# Patient Record
Sex: MALE | Race: WHITE | NOT HISPANIC OR LATINO | Employment: FULL TIME | URBAN - METROPOLITAN AREA
[De-identification: names, ages, dates, MRNs, and addresses within clinical notes are randomized per-mention and may not be internally consistent; named-entity substitution may affect disease eponyms.]

---

## 2017-10-23 ENCOUNTER — OFFICE VISIT (OUTPATIENT)
Dept: URGENT CARE | Facility: CLINIC | Age: 32
End: 2017-10-23
Payer: COMMERCIAL

## 2017-10-23 VITALS
OXYGEN SATURATION: 100 % | HEART RATE: 84 BPM | WEIGHT: 240 LBS | TEMPERATURE: 98 F | RESPIRATION RATE: 18 BRPM | BODY MASS INDEX: 33.47 KG/M2 | SYSTOLIC BLOOD PRESSURE: 143 MMHG | DIASTOLIC BLOOD PRESSURE: 85 MMHG

## 2017-10-23 DIAGNOSIS — J32.9 SINUSITIS, UNSPECIFIED CHRONICITY, UNSPECIFIED LOCATION: ICD-10-CM

## 2017-10-23 DIAGNOSIS — J02.9 PHARYNGITIS, UNSPECIFIED ETIOLOGY: Primary | ICD-10-CM

## 2017-10-23 LAB
CTP QC/QA: YES
S PYO RRNA THROAT QL PROBE: NEGATIVE

## 2017-10-23 PROCEDURE — 99203 OFFICE O/P NEW LOW 30 MIN: CPT | Mod: S$GLB,,, | Performed by: PHYSICIAN ASSISTANT

## 2017-10-23 PROCEDURE — 87880 STREP A ASSAY W/OPTIC: CPT | Mod: QW,S$GLB,, | Performed by: PHYSICIAN ASSISTANT

## 2017-10-23 RX ORDER — AMOXICILLIN AND CLAVULANATE POTASSIUM 875; 125 MG/1; MG/1
1 TABLET, FILM COATED ORAL 2 TIMES DAILY
Qty: 14 TABLET | Refills: 0 | Status: SHIPPED | OUTPATIENT
Start: 2017-10-23 | End: 2017-10-30

## 2017-10-23 NOTE — PROGRESS NOTES
Subjective:       Patient ID: Brian Gonzalez is a 32 y.o. male.    Vitals:  weight is 108.9 kg (240 lb). His temperature is 98.3 °F (36.8 °C). His blood pressure is 143/85 (abnormal) and his pulse is 84. His respiration is 18 and oxygen saturation is 100%.     Chief Complaint: Sore Throat    Sore Throat    This is a new problem. The current episode started 1 to 4 weeks ago. The problem has been unchanged. The pain is worse on the right side. There has been no fever. The pain is at a severity of 5/10. The pain is mild. Associated symptoms include congestion, a hoarse voice and swollen glands. Pertinent negatives include no abdominal pain, coughing, diarrhea, ear pain, headaches, shortness of breath or vomiting. He has had no exposure to strep or mono. He has tried nothing for the symptoms.     Review of Systems   Constitution: Negative for chills, fever and malaise/fatigue.   HENT: Positive for congestion, hoarse voice and sore throat. Negative for ear pain.    Eyes: Negative for discharge and redness.   Cardiovascular: Negative for chest pain, dyspnea on exertion and leg swelling.   Respiratory: Negative for cough, shortness of breath, sputum production and wheezing.    Musculoskeletal: Negative for myalgias.   Gastrointestinal: Negative for abdominal pain, diarrhea, nausea and vomiting.   Neurological: Negative for headaches.       Objective:      Physical Exam   Constitutional: He is oriented to person, place, and time. He appears well-developed and well-nourished. No distress.   HENT:   Head: Normocephalic and atraumatic.   Right Ear: Hearing, tympanic membrane, external ear and ear canal normal.   Left Ear: Hearing, tympanic membrane, external ear and ear canal normal.   Nose: Mucosal edema present. Right sinus exhibits no maxillary sinus tenderness and no frontal sinus tenderness. Left sinus exhibits no maxillary sinus tenderness and no frontal sinus tenderness.   Mouth/Throat: Uvula is midline and mucous  membranes are normal. Oropharyngeal exudate and posterior oropharyngeal erythema present. No posterior oropharyngeal edema.   Eyes: Conjunctivae, EOM and lids are normal.   Neck: Normal range of motion. Neck supple.   Cardiovascular: Normal rate, regular rhythm and normal heart sounds.  Exam reveals no gallop and no friction rub.    No murmur heard.  Pulmonary/Chest: Effort normal and breath sounds normal. No respiratory distress. He has no wheezes. He has no rales.   Musculoskeletal: Normal range of motion.   Lymphadenopathy:        Head (right side): Submandibular and tonsillar adenopathy present.        Head (left side): No submandibular and no tonsillar adenopathy present.   Neurological: He is alert and oriented to person, place, and time.   Skin: Skin is warm and dry. No rash noted. No erythema.   Psychiatric: He has a normal mood and affect. His behavior is normal.   Nursing note and vitals reviewed.      Assessment:       1. Pharyngitis, unspecified etiology    2. Sinusitis, unspecified chronicity, unspecified location        Office Visit on 10/23/2017   Component Date Value Ref Range Status    Rapid Strep A Screen 10/23/2017 Negative  Negative Final     Acceptable 10/23/2017 Yes   Final   ]  Plan:         Pharyngitis, unspecified etiology  -     POCT rapid strep A  -     amoxicillin-clavulanate 875-125mg (AUGMENTIN) 875-125 mg per tablet; Take 1 tablet by mouth 2 (two) times daily.  Dispense: 14 tablet; Refill: 0    Sinusitis, unspecified chronicity, unspecified location  -     amoxicillin-clavulanate 875-125mg (AUGMENTIN) 875-125 mg per tablet; Take 1 tablet by mouth 2 (two) times daily.  Dispense: 14 tablet; Refill: 0      Patient Instructions     Please follow up with your primary care provider within 2-5 days if your signs and symptoms have not resolved or worsen.     If your condition worsens or fails to improve we recommend that you receive another evaluation at the emergency room  immediately or contact your primary medical clinic to discuss your concerns.   You must understand that you have received an Urgent Care treatment only and that you may be released before all of your medical problems are known or treated. You, the patient, will arrange for follow up care as instructed.       Self-Care for Sinusitis     Drinking plenty of water can help sinuses drain.   Sinusitis can often be managed with self-care. Self-care can keep sinuses moist and make you feel more comfortable. Remember to follow your doctor's instructions closely. This can make a big difference in getting your sinus problem under control.  Drink fluids  Drinking extra fluids helps thin your mucus. This lets it drain from your sinuses more easily. Have a glass of water every hour or two. A humidifier helps in much the same way. Fluids can also offset the drying effects of certain medicines. If you use a humidifier, follow the product maker's instructions on how to use it. Clean it on a regular schedule.  Use saltwater rinses  Rinses help keep your sinuses and nose moist. Mix a teaspoon of salt in 8 ounces of fresh, warm water. Use a bulb syringe to gently squirt the water into your nose a few times a day. You can also buy ready-made saline nasal sprays.  Apply hot or cold packs  Applying heat to the area surrounding your sinuses may make you feel more comfortable. Use a hot water bottle or a hand towel dipped in hot water. Some people also find ice packs effective for relieving pain.  Medicines  Your doctor may prescribe medications to help treat your sinusitis. If you have an infection, antibiotics can help clear it up. If you are prescribed antibiotics, take all pills on schedule until they are gone, even if you feel better. Decongestants help relieve swelling. Use decongestant sprays for short periods only under the direction of your doctor. If you have allergies, your doctor may prescribe medications to help relieve them.    Date Last Reviewed: 10/1/2016  © 6442-2424 [employer]. [address] [place] [address], [place] [address]. All rights reserved. This information is not intended as a substitute for professional medical care. Always follow your healthcare professional's instructions.        Self-Care for Sore Throats    Sore throats happen for many reasons, such as colds, allergies, and infections caused by viruses or bacteria. In any case, your throat becomes red and sore. Your goal for self-care is to reduce your discomfort while giving your throat a chance to heal.  Moisten and soothe your throat  Tips include the following:  · Try a sip of water first thing after waking up.  · Keep your throat moist by drinking 6 or more glasses of clear liquids every day.  · Run a cool-air humidifier in your room overnight.  · Avoid cigarette smoke.   · Suck on throat lozenges, cough drops, hard candy, ice chips, or frozen fruit-juice bars. Use the sugar-free versions if your diet or medical condition requires them.  Gargle to ease irritation  Gargling every hour or 2 can ease irritation. Try gargling with 1 of these solutions:  · 1/4 teaspoon of salt in 1/2 cup of warm water  · An over-the-counter anesthetic gargle  Use medicine for more relief  Over-the-counter medicine can reduce sore throat symptoms. Ask your pharmacist if you have questions about which medicine to use:  · Ease pain with anesthetic sprays. Aspirin or an aspirin substitute also helps. Remember, never give aspirin to anyone 18 or younger, or if you are already taking blood thinners.   · For sore throats caused by allergies, try antihistamines to block the allergic reaction.  · Remember: unless a sore throat is caused by a bacterial infection, antibiotics wont help you.  Prevent future sore throats  Prevention tips include the following:  · Stop smoking or reduce contact with secondhand smoke. Smoke irritates the tender throat lining.  · Limit contact with pets and  with allergy-causing substances, such as pollen and mold.  · When youre around someone with a sore throat or cold, wash your hands often to keep viruses or bacteria from spreading.  · Dont strain your vocal cords.  Call your healthcare provider  Contact your healthcare provider if you have:  · A temperature over 101°F (38.3°C)  · White spots on the throat  · Great difficulty swallowing  · Trouble breathing  · A skin rash  · Recent exposure to someone else with strep bacteria  · Severe hoarseness and swollen glands in the neck or jaw   Date Last Reviewed: 8/1/2016  © 4870-1076 [employer]. [address] [place] [address]. All rights reserved. This information is not intended as a substitute for professional medical care. Always follow your healthcare professional's instructions.

## 2017-10-23 NOTE — PATIENT INSTRUCTIONS
Please follow up with your primary care provider within 2-5 days if your signs and symptoms have not resolved or worsen.     If your condition worsens or fails to improve we recommend that you receive another evaluation at the emergency room immediately or contact your primary medical clinic to discuss your concerns.   You must understand that you have received an Urgent Care treatment only and that you may be released before all of your medical problems are known or treated. You, the patient, will arrange for follow up care as instructed.       Self-Care for Sinusitis     Drinking plenty of water can help sinuses drain.   Sinusitis can often be managed with self-care. Self-care can keep sinuses moist and make you feel more comfortable. Remember to follow your doctor's instructions closely. This can make a big difference in getting your sinus problem under control.  Drink fluids  Drinking extra fluids helps thin your mucus. This lets it drain from your sinuses more easily. Have a glass of water every hour or two. A humidifier helps in much the same way. Fluids can also offset the drying effects of certain medicines. If you use a humidifier, follow the product maker's instructions on how to use it. Clean it on a regular schedule.  Use saltwater rinses  Rinses help keep your sinuses and nose moist. Mix a teaspoon of salt in 8 ounces of fresh, warm water. Use a bulb syringe to gently squirt the water into your nose a few times a day. You can also buy ready-made saline nasal sprays.  Apply hot or cold packs  Applying heat to the area surrounding your sinuses may make you feel more comfortable. Use a hot water bottle or a hand towel dipped in hot water. Some people also find ice packs effective for relieving pain.  Medicines  Your doctor may prescribe medications to help treat your sinusitis. If you have an infection, antibiotics can help clear it up. If you are prescribed antibiotics, take all pills on schedule until  they are gone, even if you feel better. Decongestants help relieve swelling. Use decongestant sprays for short periods only under the direction of your doctor. If you have allergies, your doctor may prescribe medications to help relieve them.   Date Last Reviewed: 10/1/2016  © 4326-5281 [employer]. [address] [place] [address], [place] [address]. All rights reserved. This information is not intended as a substitute for professional medical care. Always follow your healthcare professional's instructions.        Self-Care for Sore Throats    Sore throats happen for many reasons, such as colds, allergies, and infections caused by viruses or bacteria. In any case, your throat becomes red and sore. Your goal for self-care is to reduce your discomfort while giving your throat a chance to heal.  Moisten and soothe your throat  Tips include the following:  · Try a sip of water first thing after waking up.  · Keep your throat moist by drinking 6 or more glasses of clear liquids every day.  · Run a cool-air humidifier in your room overnight.  · Avoid cigarette smoke.   · Suck on throat lozenges, cough drops, hard candy, ice chips, or frozen fruit-juice bars. Use the sugar-free versions if your diet or medical condition requires them.  Gargle to ease irritation  Gargling every hour or 2 can ease irritation. Try gargling with 1 of these solutions:  · 1/4 teaspoon of salt in 1/2 cup of warm water  · An over-the-counter anesthetic gargle  Use medicine for more relief  Over-the-counter medicine can reduce sore throat symptoms. Ask your pharmacist if you have questions about which medicine to use:  · Ease pain with anesthetic sprays. Aspirin or an aspirin substitute also helps. Remember, never give aspirin to anyone 18 or younger, or if you are already taking blood thinners.   · For sore throats caused by allergies, try antihistamines to block the allergic reaction.  · Remember: unless a sore throat is caused by a  bacterial infection, antibiotics wont help you.  Prevent future sore throats  Prevention tips include the following:  · Stop smoking or reduce contact with secondhand smoke. Smoke irritates the tender throat lining.  · Limit contact with pets and with allergy-causing substances, such as pollen and mold.  · When youre around someone with a sore throat or cold, wash your hands often to keep viruses or bacteria from spreading.  · Dont strain your vocal cords.  Call your healthcare provider  Contact your healthcare provider if you have:  · A temperature over 101°F (38.3°C)  · White spots on the throat  · Great difficulty swallowing  · Trouble breathing  · A skin rash  · Recent exposure to someone else with strep bacteria  · Severe hoarseness and swollen glands in the neck or jaw   Date Last Reviewed: 8/1/2016  © 6615-6858 [employer]. [address] [place] [address], [place] [address]. All rights reserved. This information is not intended as a substitute for professional medical care. Always follow your healthcare professional's instructions.

## 2021-04-07 ENCOUNTER — IMMUNIZATION (OUTPATIENT)
Dept: FAMILY MEDICINE | Facility: CLINIC | Age: 36
End: 2021-04-07
Payer: COMMERCIAL

## 2021-04-07 DIAGNOSIS — Z23 NEED FOR VACCINATION: Primary | ICD-10-CM

## 2021-04-07 PROCEDURE — 0001A COVID-19, MRNA, LNP-S, PF, 30 MCG/0.3 ML DOSE VACCINE: ICD-10-PCS | Mod: CV19,S$GLB,, | Performed by: INTERNAL MEDICINE

## 2021-04-07 PROCEDURE — 91300 COVID-19, MRNA, LNP-S, PF, 30 MCG/0.3 ML DOSE VACCINE: ICD-10-PCS | Mod: S$GLB,,, | Performed by: INTERNAL MEDICINE

## 2021-04-07 PROCEDURE — 91300 COVID-19, MRNA, LNP-S, PF, 30 MCG/0.3 ML DOSE VACCINE: CPT | Mod: S$GLB,,, | Performed by: INTERNAL MEDICINE

## 2021-04-07 PROCEDURE — 0001A COVID-19, MRNA, LNP-S, PF, 30 MCG/0.3 ML DOSE VACCINE: CPT | Mod: CV19,S$GLB,, | Performed by: INTERNAL MEDICINE

## 2021-04-28 ENCOUNTER — IMMUNIZATION (OUTPATIENT)
Dept: FAMILY MEDICINE | Facility: CLINIC | Age: 36
End: 2021-04-28

## 2021-04-28 DIAGNOSIS — Z23 NEED FOR VACCINATION: Primary | ICD-10-CM

## 2021-04-28 PROCEDURE — 91300 COVID-19, MRNA, LNP-S, PF, 30 MCG/0.3 ML DOSE VACCINE: CPT | Mod: ,,, | Performed by: FAMILY MEDICINE

## 2021-04-28 PROCEDURE — 0002A COVID-19, MRNA, LNP-S, PF, 30 MCG/0.3 ML DOSE VACCINE: ICD-10-PCS | Mod: CV19,,, | Performed by: FAMILY MEDICINE

## 2021-04-28 PROCEDURE — 91300 COVID-19, MRNA, LNP-S, PF, 30 MCG/0.3 ML DOSE VACCINE: ICD-10-PCS | Mod: ,,, | Performed by: FAMILY MEDICINE

## 2021-04-28 PROCEDURE — 0002A COVID-19, MRNA, LNP-S, PF, 30 MCG/0.3 ML DOSE VACCINE: CPT | Mod: CV19,,, | Performed by: FAMILY MEDICINE

## 2021-11-30 ENCOUNTER — IMMUNIZATION (OUTPATIENT)
Dept: FAMILY MEDICINE | Facility: CLINIC | Age: 36
End: 2021-11-30
Payer: MEDICAID

## 2021-11-30 DIAGNOSIS — Z23 NEED FOR VACCINATION: Primary | ICD-10-CM

## 2021-11-30 PROCEDURE — 0004A COVID-19, MRNA, LNP-S, PF, 30 MCG/0.3 ML DOSE VACCINE: CPT | Mod: PBBFAC,PO

## 2022-11-02 ENCOUNTER — LAB VISIT (OUTPATIENT)
Dept: LAB | Facility: HOSPITAL | Age: 37
End: 2022-11-02
Attending: STUDENT IN AN ORGANIZED HEALTH CARE EDUCATION/TRAINING PROGRAM
Payer: COMMERCIAL

## 2022-11-02 ENCOUNTER — OFFICE VISIT (OUTPATIENT)
Dept: FAMILY MEDICINE | Facility: CLINIC | Age: 37
End: 2022-11-02
Payer: COMMERCIAL

## 2022-11-02 VITALS
HEIGHT: 72 IN | BODY MASS INDEX: 31.08 KG/M2 | OXYGEN SATURATION: 98 % | WEIGHT: 229.5 LBS | SYSTOLIC BLOOD PRESSURE: 136 MMHG | HEART RATE: 61 BPM | RESPIRATION RATE: 18 BRPM | DIASTOLIC BLOOD PRESSURE: 82 MMHG

## 2022-11-02 DIAGNOSIS — Z23 NEED FOR VACCINATION: ICD-10-CM

## 2022-11-02 DIAGNOSIS — F41.9 ANXIETY: ICD-10-CM

## 2022-11-02 DIAGNOSIS — R42 DIZZINESS: ICD-10-CM

## 2022-11-02 DIAGNOSIS — Z00.00 HEALTHCARE MAINTENANCE: ICD-10-CM

## 2022-11-02 DIAGNOSIS — J30.2 SEASONAL ALLERGIES: ICD-10-CM

## 2022-11-02 DIAGNOSIS — Z00.00 HEALTHCARE MAINTENANCE: Primary | ICD-10-CM

## 2022-11-02 LAB
ANION GAP SERPL CALC-SCNC: 7 MMOL/L (ref 8–16)
BUN SERPL-MCNC: 11 MG/DL (ref 6–20)
CALCIUM SERPL-MCNC: 9 MG/DL (ref 8.7–10.5)
CHLORIDE SERPL-SCNC: 103 MMOL/L (ref 95–110)
CHOLEST SERPL-MCNC: 185 MG/DL (ref 120–199)
CHOLEST/HDLC SERPL: 5.1 {RATIO} (ref 2–5)
CO2 SERPL-SCNC: 29 MMOL/L (ref 23–29)
CREAT SERPL-MCNC: 0.9 MG/DL (ref 0.5–1.4)
EST. GFR  (NO RACE VARIABLE): >60 ML/MIN/1.73 M^2
GLUCOSE SERPL-MCNC: 86 MG/DL (ref 70–110)
HCV AB SERPL QL IA: NORMAL
HDLC SERPL-MCNC: 36 MG/DL (ref 40–75)
HDLC SERPL: 19.5 % (ref 20–50)
HIV 1+2 AB+HIV1 P24 AG SERPL QL IA: NORMAL
LDLC SERPL CALC-MCNC: 123.8 MG/DL (ref 63–159)
NONHDLC SERPL-MCNC: 149 MG/DL
POTASSIUM SERPL-SCNC: 3.9 MMOL/L (ref 3.5–5.1)
SODIUM SERPL-SCNC: 139 MMOL/L (ref 136–145)
TRIGL SERPL-MCNC: 126 MG/DL (ref 30–150)

## 2022-11-02 PROCEDURE — 36415 COLL VENOUS BLD VENIPUNCTURE: CPT | Mod: PO | Performed by: STUDENT IN AN ORGANIZED HEALTH CARE EDUCATION/TRAINING PROGRAM

## 2022-11-02 PROCEDURE — 3075F SYST BP GE 130 - 139MM HG: CPT | Mod: CPTII,S$GLB,, | Performed by: STUDENT IN AN ORGANIZED HEALTH CARE EDUCATION/TRAINING PROGRAM

## 2022-11-02 PROCEDURE — 3075F PR MOST RECENT SYSTOLIC BLOOD PRESS GE 130-139MM HG: ICD-10-PCS | Mod: CPTII,S$GLB,, | Performed by: STUDENT IN AN ORGANIZED HEALTH CARE EDUCATION/TRAINING PROGRAM

## 2022-11-02 PROCEDURE — 87389 HIV-1 AG W/HIV-1&-2 AB AG IA: CPT | Performed by: STUDENT IN AN ORGANIZED HEALTH CARE EDUCATION/TRAINING PROGRAM

## 2022-11-02 PROCEDURE — 99204 OFFICE O/P NEW MOD 45 MIN: CPT | Mod: S$GLB,,, | Performed by: STUDENT IN AN ORGANIZED HEALTH CARE EDUCATION/TRAINING PROGRAM

## 2022-11-02 PROCEDURE — 91312 COVID-19, MRNA, LNP-S, BIVALENT BOOSTER, PF, 30 MCG/0.3 ML DOSE: CPT | Mod: S$GLB,,, | Performed by: STUDENT IN AN ORGANIZED HEALTH CARE EDUCATION/TRAINING PROGRAM

## 2022-11-02 PROCEDURE — 99999 PR PBB SHADOW E&M-EST. PATIENT-LVL IV: CPT | Mod: PBBFAC,,, | Performed by: STUDENT IN AN ORGANIZED HEALTH CARE EDUCATION/TRAINING PROGRAM

## 2022-11-02 PROCEDURE — 1159F PR MEDICATION LIST DOCUMENTED IN MEDICAL RECORD: ICD-10-PCS | Mod: CPTII,S$GLB,, | Performed by: STUDENT IN AN ORGANIZED HEALTH CARE EDUCATION/TRAINING PROGRAM

## 2022-11-02 PROCEDURE — 3079F PR MOST RECENT DIASTOLIC BLOOD PRESSURE 80-89 MM HG: ICD-10-PCS | Mod: CPTII,S$GLB,, | Performed by: STUDENT IN AN ORGANIZED HEALTH CARE EDUCATION/TRAINING PROGRAM

## 2022-11-02 PROCEDURE — 3008F PR BODY MASS INDEX (BMI) DOCUMENTED: ICD-10-PCS | Mod: CPTII,S$GLB,, | Performed by: STUDENT IN AN ORGANIZED HEALTH CARE EDUCATION/TRAINING PROGRAM

## 2022-11-02 PROCEDURE — 80061 LIPID PANEL: CPT | Performed by: STUDENT IN AN ORGANIZED HEALTH CARE EDUCATION/TRAINING PROGRAM

## 2022-11-02 PROCEDURE — 99999 PR PBB SHADOW E&M-EST. PATIENT-LVL IV: ICD-10-PCS | Mod: PBBFAC,,, | Performed by: STUDENT IN AN ORGANIZED HEALTH CARE EDUCATION/TRAINING PROGRAM

## 2022-11-02 PROCEDURE — 0124A COVID-19, MRNA, LNP-S, BIVALENT BOOSTER, PF, 30 MCG/0.3 ML DOSE: CPT | Mod: PBBFAC | Performed by: STUDENT IN AN ORGANIZED HEALTH CARE EDUCATION/TRAINING PROGRAM

## 2022-11-02 PROCEDURE — 3008F BODY MASS INDEX DOCD: CPT | Mod: CPTII,S$GLB,, | Performed by: STUDENT IN AN ORGANIZED HEALTH CARE EDUCATION/TRAINING PROGRAM

## 2022-11-02 PROCEDURE — 1159F MED LIST DOCD IN RCRD: CPT | Mod: CPTII,S$GLB,, | Performed by: STUDENT IN AN ORGANIZED HEALTH CARE EDUCATION/TRAINING PROGRAM

## 2022-11-02 PROCEDURE — 3079F DIAST BP 80-89 MM HG: CPT | Mod: CPTII,S$GLB,, | Performed by: STUDENT IN AN ORGANIZED HEALTH CARE EDUCATION/TRAINING PROGRAM

## 2022-11-02 PROCEDURE — 80048 BASIC METABOLIC PNL TOTAL CA: CPT | Performed by: STUDENT IN AN ORGANIZED HEALTH CARE EDUCATION/TRAINING PROGRAM

## 2022-11-02 PROCEDURE — 91312 COVID-19, MRNA, LNP-S, BIVALENT BOOSTER, PF, 30 MCG/0.3 ML DOSE: ICD-10-PCS | Mod: S$GLB,,, | Performed by: STUDENT IN AN ORGANIZED HEALTH CARE EDUCATION/TRAINING PROGRAM

## 2022-11-02 PROCEDURE — 86803 HEPATITIS C AB TEST: CPT | Performed by: STUDENT IN AN ORGANIZED HEALTH CARE EDUCATION/TRAINING PROGRAM

## 2022-11-02 PROCEDURE — 99204 PR OFFICE/OUTPT VISIT, NEW, LEVL IV, 45-59 MIN: ICD-10-PCS | Mod: S$GLB,,, | Performed by: STUDENT IN AN ORGANIZED HEALTH CARE EDUCATION/TRAINING PROGRAM

## 2022-11-02 RX ORDER — MECLIZINE HCL 12.5 MG 12.5 MG/1
12.5 TABLET ORAL 3 TIMES DAILY PRN
Qty: 60 TABLET | Refills: 3 | Status: SHIPPED | OUTPATIENT
Start: 2022-11-02 | End: 2023-02-06

## 2022-11-02 RX ORDER — CETIRIZINE HYDROCHLORIDE 5 MG/1
5 TABLET ORAL DAILY PRN
COMMUNITY

## 2022-11-02 NOTE — ASSESSMENT & PLAN NOTE
Patient currently feels he has enough control over anxiety without medications. Continue to monitor.

## 2022-11-02 NOTE — ASSESSMENT & PLAN NOTE
Not like true vertigo but possibly related to middle ear. Does not seem orthostatic in nature. Start meclizine as needed. Referral to ENT.

## 2022-11-02 NOTE — PROGRESS NOTES
Name: Brian Gonzalez  MRN: [id number]  : 1985    HPI  Mr. Gonzalez is here to establish care.  Of concern today is dizziness/off balance feeling. He will get this if he turns his head certain ways - notes if he is looking up at a television and turns his head or if he bends over to look under a piece of furniture. No strong sense of spinning, more so a feeling of balance. Episodes can last up to 30 minutes, will have to lie down or sleep until they are over. Has occasional tinnitus. No ear pain.     Had a panic attack last week. He used to suffer anxiety and regular panic attacks in his college days that he managed non-pharmacologically. He was on a chronic medication of some kind (he doesn't remember which one), but he did not like the way it made him feel. Certain stressors culminated in panic attack last week.    Review of Systems   HENT:  Positive for tinnitus. Negative for ear pain and hearing loss.    Eyes:  Negative for visual disturbance.   Respiratory:  Negative for shortness of breath.    Cardiovascular:  Negative for chest pain and leg swelling.   Gastrointestinal:  Negative for constipation, diarrhea, nausea and vomiting.   Musculoskeletal:  Positive for arthralgias (right knee, chronic, unchanged). Negative for myalgias.   Skin:  Negative for color change and rash.   Neurological:  Positive for dizziness and light-headedness. Negative for weakness and numbness.      Patient Active Problem List   Diagnosis    Anxiety         Current Outpatient Medications on File Prior to Visit   Medication Sig Dispense Refill    cetirizine (ZYRTEC) 5 MG tablet Take 5 mg by mouth daily as needed.      [DISCONTINUED] ibuprofen (ADVIL,MOTRIN) 800 MG tablet Take 1 tablet (800 mg total) by mouth every 8 (eight) hours as needed for Pain. 20 tablet 0     No current facility-administered medications on file prior to visit.       Past Medical History:   Diagnosis Date    Anxiety        Past Surgical History:   Procedure  Laterality Date    FOREARM FRACTURE SURGERY Left 2000    FOREARM SURGERY Right 1995    Hook removal        Family History   Problem Relation Age of Onset    Breast cancer Mother 50    Diabetes Maternal Grandmother     Breast cancer Maternal Aunt     Ovarian cancer Maternal Aunt     Heart disease Neg Hx        Social History     Socioeconomic History    Marital status:    Occupational History    Occupation:    Tobacco Use    Smoking status: Never    Smokeless tobacco: Never   Substance and Sexual Activity    Alcohol use: No    Drug use: No   Social History Narrative    Lives with wife. Two cats. 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        Review of patient's allergies indicates:  No Known Allergies     Vitals:    11/02/22 1047   BP: 136/82   Pulse: 61   Resp: 18        Physical Exam  Constitutional:       General: He is not in acute distress.     Appearance: Normal appearance. He is well-developed.   HENT:      Head: Normocephalic and atraumatic.      Comments: Negative Momence-Hallpike     Right Ear: External ear normal.      Left Ear: External ear normal.   Eyes:      Conjunctiva/sclera: Conjunctivae normal.   Cardiovascular:      Rate and Rhythm: Normal rate and regular rhythm.      Heart sounds: No murmur heard.    No friction rub. No gallop.   Pulmonary:      Effort: Pulmonary effort is normal. No respiratory distress.      Breath sounds: No wheezing, rhonchi or rales.   Abdominal:      General: Abdomen is flat. There is no distension.   Musculoskeletal:         General: No swelling or deformity.      Right lower leg: No edema.      Left lower leg: No edema.   Skin:     General: Skin is warm and dry.      Coloration: Skin is not jaundiced.   Neurological:      Mental Status: He is alert and oriented to person, place, and time. Mental status is at baseline.      Coordination: Romberg sign negative. Coordination normal. Finger-Nose-Finger Test and Heel to Shin Test normal.      Deep Tendon  Reflexes:      Reflex Scores:       Patellar reflexes are 2+ on the right side and 2+ on the left side.  Psychiatric:         Attention and Perception: Attention and perception normal.         Mood and Affect: Mood normal.         Speech: Speech normal.         Behavior: Behavior normal. Behavior is cooperative.         Thought Content: Thought content normal.         Cognition and Memory: Cognition normal.         Judgment: Judgment normal.        1. Healthcare maintenance  -     Basic Metabolic Panel; Future; Expected date: 11/02/2022  -     Hepatitis C Antibody; Future; Expected date: 11/02/2022  -     HIV 1/2 Ag/Ab (4th Gen); Future; Expected date: 11/02/2022  -     Lipid Panel; Future; Expected date: 11/02/2022  -     COVID-19-MRNA-(PF)(Pfizer Omicron) Vaccine    2. Anxiety  Assessment & Plan:  Patient currently feels he has enough control over anxiety without medications. Continue to monitor.      3. Need for vaccination  -     COVID-19-MRNA-(PF)(Pfizer Omicron) Vaccine    4. Dizziness  Assessment & Plan:  Not like true vertigo but possibly related to middle ear. Does not seem orthostatic in nature. Start meclizine as needed. Referral to ENT.    Orders:  -     meclizine (ANTIVERT) 12.5 mg tablet; Take 1 tablet (12.5 mg total) by mouth 3 (three) times daily as needed for Dizziness.  Dispense: 60 tablet; Refill: 3  -     Ambulatory referral/consult to ENT; Future; Expected date: 11/09/2022    5. Seasonal allergies      Follow up in 6 months    Ronaldo Gutierrez MD  11/02/2022

## 2022-11-10 ENCOUNTER — OFFICE VISIT (OUTPATIENT)
Dept: FAMILY MEDICINE | Facility: CLINIC | Age: 37
End: 2022-11-10
Payer: COMMERCIAL

## 2022-11-10 ENCOUNTER — TELEPHONE (OUTPATIENT)
Dept: FAMILY MEDICINE | Facility: CLINIC | Age: 37
End: 2022-11-10
Payer: COMMERCIAL

## 2022-11-10 ENCOUNTER — HOSPITAL ENCOUNTER (OUTPATIENT)
Dept: RADIOLOGY | Facility: HOSPITAL | Age: 37
Discharge: HOME | End: 2022-11-10
Attending: NURSE PRACTITIONER
Payer: COMMERCIAL

## 2022-11-10 VITALS
BODY MASS INDEX: 30.87 KG/M2 | SYSTOLIC BLOOD PRESSURE: 128 MMHG | DIASTOLIC BLOOD PRESSURE: 80 MMHG | TEMPERATURE: 98 F | HEART RATE: 66 BPM | OXYGEN SATURATION: 95 % | HEIGHT: 72 IN | WEIGHT: 227.94 LBS

## 2022-11-10 DIAGNOSIS — R11.0 NAUSEA: ICD-10-CM

## 2022-11-10 DIAGNOSIS — R10.32 LEFT LOWER QUADRANT PAIN: ICD-10-CM

## 2022-11-10 DIAGNOSIS — K21.9 GASTROESOPHAGEAL REFLUX DISEASE, UNSPECIFIED WHETHER ESOPHAGITIS PRESENT: ICD-10-CM

## 2022-11-10 DIAGNOSIS — R10.32 LEFT LOWER QUADRANT PAIN: Primary | ICD-10-CM

## 2022-11-10 PROCEDURE — 3008F BODY MASS INDEX DOCD: CPT | Mod: CPTII,S$GLB,, | Performed by: NURSE PRACTITIONER

## 2022-11-10 PROCEDURE — 3008F PR BODY MASS INDEX (BMI) DOCUMENTED: ICD-10-PCS | Mod: CPTII,S$GLB,, | Performed by: NURSE PRACTITIONER

## 2022-11-10 PROCEDURE — 99999 PR PBB SHADOW E&M-EST. PATIENT-LVL III: ICD-10-PCS | Mod: PBBFAC,,, | Performed by: NURSE PRACTITIONER

## 2022-11-10 PROCEDURE — 74177 CT ABDOMEN PELVIS WITH CONTRAST: ICD-10-PCS | Mod: 26,,, | Performed by: RADIOLOGY

## 2022-11-10 PROCEDURE — 74177 CT ABD & PELVIS W/CONTRAST: CPT | Mod: TC,PO

## 2022-11-10 PROCEDURE — 99214 OFFICE O/P EST MOD 30 MIN: CPT | Mod: S$GLB,,, | Performed by: NURSE PRACTITIONER

## 2022-11-10 PROCEDURE — 1159F MED LIST DOCD IN RCRD: CPT | Mod: CPTII,S$GLB,, | Performed by: NURSE PRACTITIONER

## 2022-11-10 PROCEDURE — 74177 CT ABD & PELVIS W/CONTRAST: CPT | Mod: 26,,, | Performed by: RADIOLOGY

## 2022-11-10 PROCEDURE — 1159F PR MEDICATION LIST DOCUMENTED IN MEDICAL RECORD: ICD-10-PCS | Mod: CPTII,S$GLB,, | Performed by: NURSE PRACTITIONER

## 2022-11-10 PROCEDURE — 99999 PR PBB SHADOW E&M-EST. PATIENT-LVL III: CPT | Mod: PBBFAC,,, | Performed by: NURSE PRACTITIONER

## 2022-11-10 PROCEDURE — A9698 NON-RAD CONTRAST MATERIALNOC: HCPCS | Mod: PO | Performed by: NURSE PRACTITIONER

## 2022-11-10 PROCEDURE — 3079F DIAST BP 80-89 MM HG: CPT | Mod: CPTII,S$GLB,, | Performed by: NURSE PRACTITIONER

## 2022-11-10 PROCEDURE — 3074F SYST BP LT 130 MM HG: CPT | Mod: CPTII,S$GLB,, | Performed by: NURSE PRACTITIONER

## 2022-11-10 PROCEDURE — 25500020 PHARM REV CODE 255: Mod: PO | Performed by: NURSE PRACTITIONER

## 2022-11-10 PROCEDURE — 3079F PR MOST RECENT DIASTOLIC BLOOD PRESSURE 80-89 MM HG: ICD-10-PCS | Mod: CPTII,S$GLB,, | Performed by: NURSE PRACTITIONER

## 2022-11-10 PROCEDURE — 3074F PR MOST RECENT SYSTOLIC BLOOD PRESSURE < 130 MM HG: ICD-10-PCS | Mod: CPTII,S$GLB,, | Performed by: NURSE PRACTITIONER

## 2022-11-10 PROCEDURE — 99214 PR OFFICE/OUTPT VISIT, EST, LEVL IV, 30-39 MIN: ICD-10-PCS | Mod: S$GLB,,, | Performed by: NURSE PRACTITIONER

## 2022-11-10 RX ORDER — PANTOPRAZOLE SODIUM 40 MG/1
40 TABLET, DELAYED RELEASE ORAL DAILY
Qty: 14 TABLET | Refills: 0 | Status: SHIPPED | OUTPATIENT
Start: 2022-11-10 | End: 2023-11-10

## 2022-11-10 RX ORDER — ONDANSETRON 8 MG/1
8 TABLET, ORALLY DISINTEGRATING ORAL EVERY 6 HOURS PRN
Qty: 30 TABLET | Refills: 0 | Status: SHIPPED | OUTPATIENT
Start: 2022-11-10 | End: 2022-11-15 | Stop reason: SDUPTHER

## 2022-11-10 RX ADMIN — IOHEXOL 100 ML: 350 INJECTION, SOLUTION INTRAVENOUS at 03:11

## 2022-11-10 RX ADMIN — BARIUM SULFATE 900 ML: 20 SUSPENSION ORAL at 04:11

## 2022-11-10 NOTE — PROGRESS NOTES
Subjective:       Patient ID: Brian Gonzalez is a 37 y.o. male.    Chief Complaint: Rectal Bleeding (Occurring problem ) and Nausea    HPI  New patient to me presents for abdominal pain  Started as nausea --onset 5 days ago--Waxing and waning. No vomiting   Progressed to bilateral lower abdominal pain--L>R  Epigastric discomfort but not pain. Feels increased reflux symptoms--History of reflux triggered by certain foods.   Reports normal bowel pattern but does note occasional blood in stool which he has associated with hemorrhoids. No rectal bleeding   Afebrile   Unsure of last BM--has had one in the past 5 days  Denies diarrhea     Reports increased anxiety which may be contributing to symptoms     Vitals:    11/10/22 1303   BP: 128/80   Pulse: 66   Temp: 98.3 °F (36.8 °C)     Review of Systems   Constitutional:  Negative for fever.   Respiratory:  Negative for cough.    Cardiovascular:  Negative for chest pain.   Gastrointestinal:  Positive for abdominal pain, blood in stool and nausea. Negative for constipation, diarrhea and vomiting.   Genitourinary:  Negative for difficulty urinating, dysuria and hematuria.     Past Medical History:   Diagnosis Date    Anxiety      Objective:      Physical Exam  Vitals and nursing note reviewed.   Constitutional:       General: He is not in acute distress.     Appearance: He is not diaphoretic.   HENT:      Head: Normocephalic.   Eyes:      General: Lids are normal.         Right eye: No discharge.         Left eye: No discharge.   Neck:      Trachea: No tracheal deviation.   Cardiovascular:      Rate and Rhythm: Normal rate.      Heart sounds: Normal heart sounds.   Pulmonary:      Effort: Pulmonary effort is normal.      Breath sounds: Normal breath sounds.   Abdominal:      Palpations: Abdomen is soft.      Tenderness: There is abdominal tenderness in the left lower quadrant. There is no right CVA tenderness or left CVA tenderness.   Skin:     Coloration: Skin is not  pale.   Neurological:      Mental Status: He is alert and oriented to person, place, and time.   Psychiatric:         Speech: Speech normal.         Behavior: Behavior normal.         Thought Content: Thought content normal.         Judgment: Judgment normal.       Assessment:       1. Left lower quadrant pain    2. Nausea    3. Gastroesophageal reflux disease, unspecified whether esophagitis present        Plan:       Left lower quadrant pain  -     CBC Auto Differential; Future; Expected date: 11/10/2022  -     Comprehensive Metabolic Panel; Future; Expected date: 11/10/2022  -     LIPASE; Future; Expected date: 11/10/2022  -     CT Abdomen Pelvis With Contrast; Future; Expected date: 11/10/2022    Nausea  -     ondansetron (ZOFRAN-ODT) 8 MG TbDL; Take 1 tablet (8 mg total) by mouth every 6 (six) hours as needed (nausea).  Dispense: 30 tablet; Refill: 0    Gastroesophageal reflux disease, unspecified whether esophagitis present  -     pantoprazole (PROTONIX) 40 MG tablet; Take 1 tablet (40 mg total) by mouth once daily.  Dispense: 14 tablet; Refill: 0        Workup as above  education provided on supportive care, symptom monitoring and return/ER precautions         Medication List with Changes/Refills   New Medications    ONDANSETRON (ZOFRAN-ODT) 8 MG TBDL    Take 1 tablet (8 mg total) by mouth every 6 (six) hours as needed (nausea).    PANTOPRAZOLE (PROTONIX) 40 MG TABLET    Take 1 tablet (40 mg total) by mouth once daily.   Current Medications    CETIRIZINE (ZYRTEC) 5 MG TABLET    Take 5 mg by mouth daily as needed.    MECLIZINE (ANTIVERT) 12.5 MG TABLET    Take 1 tablet (12.5 mg total) by mouth 3 (three) times daily as needed for Dizziness.

## 2022-11-10 NOTE — TELEPHONE ENCOUNTER
----- Message from Ant Michael sent at 11/10/2022  9:28 AM CST -----  Type:  Sooner Apoointment Request    Caller is requesting a sooner appointment.  Caller declined first available appointment listed below.  Caller will not accept being placed on the waitlist and is requesting a message be sent to doctor.  Name of Caller:Brian Gonzalez     When is the first available appointment? No available appointments     Symptoms:nausea , Blood in urine and abdominal pain    Would the patient rather a call back or a response via [facility]? Call back     Best Call Back Number:[phone] (mobile)     Additional Information:Pt is ok with seeing a NP if available

## 2022-11-11 DIAGNOSIS — R91.1 LUNG NODULE: ICD-10-CM

## 2022-11-11 DIAGNOSIS — R16.0 LIVER MASS: ICD-10-CM

## 2022-11-11 DIAGNOSIS — K76.9 LIVER DISEASE, UNSPECIFIED: Primary | ICD-10-CM

## 2022-11-14 ENCOUNTER — PATIENT MESSAGE (OUTPATIENT)
Dept: FAMILY MEDICINE | Facility: CLINIC | Age: 37
End: 2022-11-14
Payer: COMMERCIAL

## 2022-11-14 DIAGNOSIS — R11.0 NAUSEA: Primary | ICD-10-CM

## 2022-11-15 RX ORDER — ONDANSETRON 4 MG/1
4 TABLET, FILM COATED ORAL EVERY 6 HOURS PRN
Qty: 30 TABLET | Refills: 0 | Status: SHIPPED | OUTPATIENT
Start: 2022-11-15 | End: 2023-02-06

## 2022-11-30 ENCOUNTER — PATIENT MESSAGE (OUTPATIENT)
Dept: FAMILY MEDICINE | Facility: CLINIC | Age: 37
End: 2022-11-30
Payer: COMMERCIAL

## 2022-11-30 DIAGNOSIS — R16.0 LIVER MASS: Primary | ICD-10-CM

## 2023-01-26 ENCOUNTER — OFFICE VISIT (OUTPATIENT)
Dept: OTOLARYNGOLOGY | Facility: CLINIC | Age: 38
End: 2023-01-26
Payer: COMMERCIAL

## 2023-01-26 VITALS
BODY MASS INDEX: 30.75 KG/M2 | HEIGHT: 72 IN | DIASTOLIC BLOOD PRESSURE: 80 MMHG | WEIGHT: 227.06 LBS | SYSTOLIC BLOOD PRESSURE: 130 MMHG

## 2023-01-26 DIAGNOSIS — J30.81 ALLERGIC RHINITIS DUE TO ANIMAL HAIR AND DANDER: ICD-10-CM

## 2023-01-26 DIAGNOSIS — R42 DIZZY: Primary | ICD-10-CM

## 2023-01-26 DIAGNOSIS — G44.229 CHRONIC TENSION-TYPE HEADACHE, NOT INTRACTABLE: ICD-10-CM

## 2023-01-26 PROCEDURE — 3079F PR MOST RECENT DIASTOLIC BLOOD PRESSURE 80-89 MM HG: ICD-10-PCS | Mod: CPTII,S$GLB,, | Performed by: STUDENT IN AN ORGANIZED HEALTH CARE EDUCATION/TRAINING PROGRAM

## 2023-01-26 PROCEDURE — 1159F MED LIST DOCD IN RCRD: CPT | Mod: CPTII,S$GLB,, | Performed by: STUDENT IN AN ORGANIZED HEALTH CARE EDUCATION/TRAINING PROGRAM

## 2023-01-26 PROCEDURE — 99999 PR PBB SHADOW E&M-EST. PATIENT-LVL III: CPT | Mod: PBBFAC,,, | Performed by: STUDENT IN AN ORGANIZED HEALTH CARE EDUCATION/TRAINING PROGRAM

## 2023-01-26 PROCEDURE — 3075F SYST BP GE 130 - 139MM HG: CPT | Mod: CPTII,S$GLB,, | Performed by: STUDENT IN AN ORGANIZED HEALTH CARE EDUCATION/TRAINING PROGRAM

## 2023-01-26 PROCEDURE — 3008F PR BODY MASS INDEX (BMI) DOCUMENTED: ICD-10-PCS | Mod: CPTII,S$GLB,, | Performed by: STUDENT IN AN ORGANIZED HEALTH CARE EDUCATION/TRAINING PROGRAM

## 2023-01-26 PROCEDURE — 3079F DIAST BP 80-89 MM HG: CPT | Mod: CPTII,S$GLB,, | Performed by: STUDENT IN AN ORGANIZED HEALTH CARE EDUCATION/TRAINING PROGRAM

## 2023-01-26 PROCEDURE — 99999 PR PBB SHADOW E&M-EST. PATIENT-LVL III: ICD-10-PCS | Mod: PBBFAC,,, | Performed by: STUDENT IN AN ORGANIZED HEALTH CARE EDUCATION/TRAINING PROGRAM

## 2023-01-26 PROCEDURE — 99204 PR OFFICE/OUTPT VISIT, NEW, LEVL IV, 45-59 MIN: ICD-10-PCS | Mod: S$GLB,,, | Performed by: STUDENT IN AN ORGANIZED HEALTH CARE EDUCATION/TRAINING PROGRAM

## 2023-01-26 PROCEDURE — 3075F PR MOST RECENT SYSTOLIC BLOOD PRESS GE 130-139MM HG: ICD-10-PCS | Mod: CPTII,S$GLB,, | Performed by: STUDENT IN AN ORGANIZED HEALTH CARE EDUCATION/TRAINING PROGRAM

## 2023-01-26 PROCEDURE — 3008F BODY MASS INDEX DOCD: CPT | Mod: CPTII,S$GLB,, | Performed by: STUDENT IN AN ORGANIZED HEALTH CARE EDUCATION/TRAINING PROGRAM

## 2023-01-26 PROCEDURE — 1159F PR MEDICATION LIST DOCUMENTED IN MEDICAL RECORD: ICD-10-PCS | Mod: CPTII,S$GLB,, | Performed by: STUDENT IN AN ORGANIZED HEALTH CARE EDUCATION/TRAINING PROGRAM

## 2023-01-26 PROCEDURE — 99204 OFFICE O/P NEW MOD 45 MIN: CPT | Mod: S$GLB,,, | Performed by: STUDENT IN AN ORGANIZED HEALTH CARE EDUCATION/TRAINING PROGRAM

## 2023-01-26 RX ORDER — AZELASTINE 1 MG/ML
1 SPRAY, METERED NASAL 2 TIMES DAILY
Qty: 30 ML | Refills: 11 | Status: SHIPPED | OUTPATIENT
Start: 2023-01-26 | End: 2024-01-26

## 2023-01-26 RX ORDER — FLUTICASONE PROPIONATE 50 MCG
1 SPRAY, SUSPENSION (ML) NASAL 2 TIMES DAILY
Qty: 16 G | Refills: 11 | Status: SHIPPED | OUTPATIENT
Start: 2023-01-26 | End: 2024-01-26

## 2023-01-26 NOTE — PROGRESS NOTES
"Otolaryngology Clinic Note    Subjective:       Patient ID: Brian Gonzalez is a 37 y.o. male.    Chief Complaint: Dizziness      History of Present Illness: Brian Gonzalez is a 37 y.o. male presenting with dizziness for 1-2 years. Saw PCP and was referred. Also noted anxiety. [place]-Hallpike was negative.   He reports looking down and then up will trigger dizzy and nauseous. Happening weekly, some worse days. Is intermittent in a day, will go to sleep when it's bad. Not room spinning. Kind of like head catching up. Will happen if he looks under something. Rapid motion/car does not trigger. No issues at amusement park this summer. No head trauma. Gets headaches a lot. Has headaches when dizzy. No real light sensitivity, no sounds sensitivity. Seems frontal. Does not correlate sinus symptoms to headaches or dizziness. Anxiety is ok, thinks dizziness triggers anxiety. Paternal grandmother has migraines. Vision is fine, no changes with this. No changes with hearing when this happens, ringing comes and goes, not with dizzy. Ears do not feel full but are itchy. Takes tylenol for headaches which helps. No nsaids. Drinks 3-4 non caffeine drinks a day. No caffeine. Not active, no exercise. Has some BP issues, diet controlled/lost weight. Non smoker. Tried meclizine, felt weird, triggered anxiety. Not sure if it helped with dizziness.     Also reports sinus/allergy issues, seasonal, temperature changes. Gets congestion, pressure, runny nose, PND. Denies hyposmia. Assuming he has cat allergy, lives with cats. Feels like this is all the time. Itchy eyes with cat only and sneezing. Uses zyrtec PRN. Helps with itching but not the other symptoms. Has used flonase in past. May have used astelin but not sure. Does not do sinus rinses.     Saw PCP 11/2/22: "Of concern today is dizziness/off balance feeling. He will get this if he turns his head certain ways - notes if he is looking up at a television and turns his head or if " "he bends over to look under a piece of furniture. No strong sense of spinning, more so a feeling of balance. Episodes can last up to 30 minutes, will have to lie down or sleep until they are over. Has occasional tinnitus. No ear pain."       Past Surgical History:   Procedure Laterality Date    FOREARM FRACTURE SURGERY Left 2000    FOREARM SURGERY Right 1995    Hook removal     Past Medical History:   Diagnosis Date    Anxiety      Social Determinants of Health     Tobacco Use: Low Risk     Smoking Tobacco Use: Never    Smokeless Tobacco Use: Never    Passive Exposure: Not on file   Alcohol Use: Not on file   Financial Resource Strain: Not on file   Food Insecurity: Not on file   Transportation Needs: Not on file   Physical Activity: Not on file   Stress: Not on file   Social Connections: Not on file   Housing Stability: Not on file   Depression: Low Risk     Last PHQ Score: 2     Review of patient's allergies indicates:  No Known Allergies  Current Outpatient Medications   Medication Instructions    cetirizine (ZYRTEC) 5 mg, Oral, Daily PRN    meclizine (ANTIVERT) 12.5 mg, Oral, 3 times daily PRN    ondansetron (ZOFRAN) 4 mg, Oral, Every 6 hours PRN    pantoprazole (PROTONIX) 40 mg, Oral, Daily         14 point ROS below  Answers submitted by the patient for this visit:  Review of Symptoms Questionnaire  (Submitted on 1/20/2023)  None of these: Yes  sinus pressure : Yes  postnasal drip: Yes  None of these : Yes  None of these: Yes  None of these : Yes  abdominal pain: Yes  heartburn: Yes  Acid Reflux?: Yes  None of these: Yes  neck pain: Yes  None of these : Yes  Seasonal Allergies?: Yes  None of these : Yes  dizziness: Yes  headaches: Yes  Light-headedness: Yes  None of these: Yes  decreased concentration: Yes  Feeling depressed?: Yes  nervous/ anxious: Yes  sleep disturbance: Yes                Objective:      Vitals:    01/26/23 0904   BP: 130/80       General: NAD, well appearing  Eyes: Normal conjunctiva and " lids  Face: symmetric, nerve intact  Nose: The nose is without any evidence of any deformity. The nasal mucosa is moist. The septum is midline. There is no evidence of septal hematoma. The turbinates are without abnormality.   Ears: The ears are with normal-appearing pinna. Examination of the canals is normal appearing bilaterally. There is no drainage or erythema noted. The tympanic membranes are normal appearing with pearly color, normal-appearing landmarks and normal light reflex. Hearing is grossly intact.  Mouth: No obvious abnormalities to the lips. The teeth are unremarkable. The gingivae are without any obvious evidence of infection or lesion. The oral mucosa is moist and pink. There are no obvious masses to the hard or soft palate.   Oropharynx: The uvula is midline.  The tongue is midline. The posterior pharynx is without erythema or exudate. The tonsils are normal appearing.  Salivary glands: The salivary glands are symmetric and not enlarged, no masses  Neck: No lymphadenopathy, trachea midline, thryoid not enlarged.  Psych: Normal mood and affect.   Neuro: Grossly intact  Speech: fluent  [place]-hallpike negative, head roll negative   Gait and balance normal        Assessment and Plan:       1. Dizzy    2. Chronic tension-type headache, not intractable    3. Allergic rhinitis due to animal hair and dander        DIZZINESS-   Dizziness is an extremely complex problem that may arise from many sources.  I requires the coordination between the visual system, the vestibular system as well as the proprioreceptive system.  Additionally, balance is compromised in the setting of musculoskeletal, cerebral, cardiac, and numerous physiologic disorders.  The complex interplay between these systems may also lead to dizziness if there is dysynchrony between the bilateral vestibular symptoms.    Central vestibular symptoms can generally be distinguished from peripheral vestibulopathies by the presence of other non  vestibular neurologic symptoms (focal weakness, headache), light headedness (rather than true vertigo), near syncope, weak limbs, panic, fuzziness/cloudiness in mentation, and clumsiness.  Peripheral vestibulopathies are generally, at some point during their course, characterized by a true vertiginous sensation of movement, difficulty with sudden head movements, nausea, difficulty with sudden head movement, and possibly oscicllopsia.    BPPV is the most common cause of episodic vertigo.  Symptoms generally last for seconds then resolve when motionless, otitic symptoms are absent and may be provoked with sudden head motion.  This may occur spontaneously, but frequently follow head trauma or recent vestibular neuronitis.  Nystagmus is typically geotropic and directed toward the affected ear (hyperactive input to the inferior vestibular nerve via the Singular nerve). Canalith repositioning maneuvers are the method of choice for treating this condition.  Mild imbalance following is common and may last 1-2 weeks.    Vestibular neuronitis and labyrinthitis can be distinguished by the presence of sensorineural hearing loss in labyrinthitis, but during their active phase, vertigo is constant.   MRI may be necessary during this phase to exclude CNS pathology.  Frequently this is preceded by a viral illness. Both result in permanent partial end organ weakness of the affected vestibular nerve (usually superior).  Otherwise, they are essentially the same.  The early (vertiginous, 1-3 days) phase is characterized by acute onset of vertigo that is essentially constant and present even in the absence of motion.  Nystagmus is directed away from the affected ear (hypoactive).  In the acute phase, steroids, limited use of vestibular suppressants and hydration are the most effective treatment. Patients then enter the second phase of uncompensated vestibulopathy where they have a general sense of imbalance.  The duration of this phase  depends on several factors, but is generally delayed in the presence of vestibular suppressants, advanced age, central/systemic balance issues and sessile behavior.   Phase 3 is compensated vestibulopathy where symptoms generally only occur with sudden head movements and can be seen with catch up saccades on head thrust.   However, in the presence of bilateral VN, oscillopsia is the hallmark of the disease and compensation is frequently very delayed and poor.    Other causes of vertigo that are typically constant are vestibulotoxic medications and autoimmune inner ear disease and these are generally bilateral in nature.    Meniere's disease is typically (85%) unilateral and defined by 2 spontaneous vertigo attacks lasting 20 min or more, documented hearing loss (typically low tone, frequently fluctuating) and otic fullness or tinnitus in the affected ear. However, the presence of endolymphatic hydrops may result in similar symptoms, not meeting these strict criteria.  This disease can be difficult to distinguish from vestibular migraines, which are not always associated with headaches.    Superior canal dehiscence presents with episodic vertigo lasting seconds to minutes, aural fullness, autophony, hyperacusis and tinnitus (jey pulsatile).  Aural pressure is the most common presenting symptom.  Symptoms can be provoked with Valsalva.  Bone conduction thresholds are supranormal.    Perilymph fistula presents with fluctuating hearing loss, episodic vertigo lasting seconds to minutes and frequently is associated with prior barotrauma or otologic surgery.  Conservative measures such as stool softeners and bedrest frequently lead to resolution.  In cases of persistent symptoms, unfortunately there is no noninvasive diagnostic test with high specificity, and surgical exploration is sometimes necessary when this is expected.    Vestibular schwannomas may have constant or episodic vertigo, frequently SNHL and sometimes may  be accompanied by headache or facial numbness.    The diagnosis and options of management were discussed at length with the patient, including hearing, balance function and the risks associated with my recommendations. We spent a considerable amount of time discussing strategies to cope with dizziness. I emphasized the great importance of fall avoidance and activities that may be dangerous if Keshawny has an episode of dizziness.  I answered all questions in layman's terms. Based on the history, physical exam and imaging studies there is little evidence of a vestibular dysfunction.  I recommend trial sinus regimen then can consider vestibular migraines.        Allergy concerns: Daily zyrtec, add flonase and astelin twice day  Try nsaids for headaches, make sure well hydrated.     Can consider VNG, MRI, TCA at next visit. Can consider allergy testing.     RTC: 6 weeks.     Plan of care was discussed in detail with the patient, who agreed with the plan as above. All questions were answered in detail.     Minerva Gaytan MD  Otolaryngology

## 2023-02-06 ENCOUNTER — OFFICE VISIT (OUTPATIENT)
Dept: FAMILY MEDICINE | Facility: CLINIC | Age: 38
End: 2023-02-06
Payer: COMMERCIAL

## 2023-02-06 VITALS
HEIGHT: 72 IN | HEART RATE: 75 BPM | BODY MASS INDEX: 31.92 KG/M2 | DIASTOLIC BLOOD PRESSURE: 82 MMHG | SYSTOLIC BLOOD PRESSURE: 138 MMHG | WEIGHT: 235.69 LBS | OXYGEN SATURATION: 98 %

## 2023-02-06 DIAGNOSIS — J01.90 ACUTE RHINOSINUSITIS: Primary | ICD-10-CM

## 2023-02-06 DIAGNOSIS — M54.6 ACUTE MIDLINE THORACIC BACK PAIN: ICD-10-CM

## 2023-02-06 PROCEDURE — 3075F SYST BP GE 130 - 139MM HG: CPT | Mod: CPTII,S$GLB,, | Performed by: STUDENT IN AN ORGANIZED HEALTH CARE EDUCATION/TRAINING PROGRAM

## 2023-02-06 PROCEDURE — 3079F PR MOST RECENT DIASTOLIC BLOOD PRESSURE 80-89 MM HG: ICD-10-PCS | Mod: CPTII,S$GLB,, | Performed by: STUDENT IN AN ORGANIZED HEALTH CARE EDUCATION/TRAINING PROGRAM

## 2023-02-06 PROCEDURE — 3008F PR BODY MASS INDEX (BMI) DOCUMENTED: ICD-10-PCS | Mod: CPTII,S$GLB,, | Performed by: STUDENT IN AN ORGANIZED HEALTH CARE EDUCATION/TRAINING PROGRAM

## 2023-02-06 PROCEDURE — 1159F MED LIST DOCD IN RCRD: CPT | Mod: CPTII,S$GLB,, | Performed by: STUDENT IN AN ORGANIZED HEALTH CARE EDUCATION/TRAINING PROGRAM

## 2023-02-06 PROCEDURE — 99999 PR PBB SHADOW E&M-EST. PATIENT-LVL III: CPT | Mod: PBBFAC,,, | Performed by: STUDENT IN AN ORGANIZED HEALTH CARE EDUCATION/TRAINING PROGRAM

## 2023-02-06 PROCEDURE — 3075F PR MOST RECENT SYSTOLIC BLOOD PRESS GE 130-139MM HG: ICD-10-PCS | Mod: CPTII,S$GLB,, | Performed by: STUDENT IN AN ORGANIZED HEALTH CARE EDUCATION/TRAINING PROGRAM

## 2023-02-06 PROCEDURE — 3008F BODY MASS INDEX DOCD: CPT | Mod: CPTII,S$GLB,, | Performed by: STUDENT IN AN ORGANIZED HEALTH CARE EDUCATION/TRAINING PROGRAM

## 2023-02-06 PROCEDURE — 99999 PR PBB SHADOW E&M-EST. PATIENT-LVL III: ICD-10-PCS | Mod: PBBFAC,,, | Performed by: STUDENT IN AN ORGANIZED HEALTH CARE EDUCATION/TRAINING PROGRAM

## 2023-02-06 PROCEDURE — 99213 PR OFFICE/OUTPT VISIT, EST, LEVL III, 20-29 MIN: ICD-10-PCS | Mod: S$GLB,,, | Performed by: STUDENT IN AN ORGANIZED HEALTH CARE EDUCATION/TRAINING PROGRAM

## 2023-02-06 PROCEDURE — 3079F DIAST BP 80-89 MM HG: CPT | Mod: CPTII,S$GLB,, | Performed by: STUDENT IN AN ORGANIZED HEALTH CARE EDUCATION/TRAINING PROGRAM

## 2023-02-06 PROCEDURE — 99213 OFFICE O/P EST LOW 20 MIN: CPT | Mod: S$GLB,,, | Performed by: STUDENT IN AN ORGANIZED HEALTH CARE EDUCATION/TRAINING PROGRAM

## 2023-02-06 PROCEDURE — 1159F PR MEDICATION LIST DOCUMENTED IN MEDICAL RECORD: ICD-10-PCS | Mod: CPTII,S$GLB,, | Performed by: STUDENT IN AN ORGANIZED HEALTH CARE EDUCATION/TRAINING PROGRAM

## 2023-02-06 RX ORDER — PREDNISONE 20 MG/1
20 TABLET ORAL DAILY
Qty: 5 TABLET | Refills: 0 | Status: SHIPPED | OUTPATIENT
Start: 2023-02-06 | End: 2023-02-11

## 2023-02-06 NOTE — PROGRESS NOTES
Name: Brian Gonzalez  MRN: [id number]  : 1985  PCP: Ronaldo Gutierrez MD    HPI  Patient is here with sore throat that began two days ago and is progressively worsening. Has not taken any remedies. He also pulled his back yesterday - mid lower back, dull ache.     Review of Systems   Constitutional:  Negative for chills and fever.   HENT:  Positive for postnasal drip and sore throat.    Musculoskeletal:  Positive for back pain. Negative for arthralgias.     Patient Active Problem List   Diagnosis    Anxiety    Need for vaccination    Dizziness    Seasonal allergies       Vitals:    23 1447   BP: 138/82   Pulse: 75       Physical Exam  Constitutional:       General: He is not in acute distress.     Appearance: Normal appearance. He is well-developed.   HENT:      Head: Normocephalic and atraumatic.      Right Ear: External ear normal.      Left Ear: External ear normal.      Nose: Mucosal edema and rhinorrhea present. Rhinorrhea is purulent.      Mouth/Throat:      Pharynx: No pharyngeal swelling, oropharyngeal exudate or posterior oropharyngeal erythema.      Tonsils: No tonsillar exudate or tonsillar abscesses.   Eyes:      Conjunctiva/sclera: Conjunctivae normal.   Cardiovascular:      Rate and Rhythm: Normal rate and regular rhythm.      Heart sounds: No murmur heard.    No friction rub. No gallop.   Pulmonary:      Effort: Pulmonary effort is normal. No respiratory distress.      Breath sounds: No wheezing, rhonchi or rales.   Abdominal:      General: Abdomen is flat. There is no distension.   Musculoskeletal:         General: No swelling or deformity.      Right lower leg: No edema.      Left lower leg: No edema.   Skin:     General: Skin is warm and dry.      Coloration: Skin is not jaundiced.   Neurological:      Mental Status: He is alert and oriented to person, place, and time. Mental status is at baseline.   Psychiatric:         Attention and Perception: Attention and perception normal.          Mood and Affect: Mood normal.         Speech: Speech normal.         Behavior: Behavior normal. Behavior is cooperative.         Thought Content: Thought content normal.         Cognition and Memory: Cognition normal.         Judgment: Judgment normal.       1. Acute rhinosinusitis  Assessment & Plan:  Recommending nasal saline rinses, nasal steroid spray, and anti-allergy medications. Return precautions given.      Orders:  -     POCT Rapid Strep A  -     predniSONE (DELTASONE) 20 MG tablet; Take 1 tablet (20 mg total) by mouth once daily. for 5 days  Dispense: 5 tablet; Refill: 0    2. Acute midline thoracic back pain  Assessment & Plan:  Take NSAIDs as needed. Expected to resolve within 4 weeks.          Follow up as previously scheduled.     Ronaldo Gutierrez MD  02/06/2023

## 2023-02-06 NOTE — ASSESSMENT & PLAN NOTE
Recommending nasal saline rinses, nasal steroid spray, and anti-allergy medications. Return precautions given.

## 2023-02-28 ENCOUNTER — HOSPITAL ENCOUNTER (OUTPATIENT)
Dept: RADIOLOGY | Facility: HOSPITAL | Age: 38
Discharge: HOME | End: 2023-02-28
Attending: NURSE PRACTITIONER
Payer: COMMERCIAL

## 2023-02-28 DIAGNOSIS — R16.0 LIVER MASS: ICD-10-CM

## 2023-02-28 PROCEDURE — 76705 US ABDOMEN LIMITED_LIVER: ICD-10-PCS | Mod: 26,,, | Performed by: RADIOLOGY

## 2023-02-28 PROCEDURE — 76705 ECHO EXAM OF ABDOMEN: CPT | Mod: TC,PO

## 2023-02-28 PROCEDURE — 76705 ECHO EXAM OF ABDOMEN: CPT | Mod: 26,,, | Performed by: RADIOLOGY

## 2023-03-07 ENCOUNTER — OFFICE VISIT (OUTPATIENT)
Dept: OTOLARYNGOLOGY | Facility: CLINIC | Age: 38
End: 2023-03-07
Payer: COMMERCIAL

## 2023-03-07 ENCOUNTER — PATIENT MESSAGE (OUTPATIENT)
Dept: AUDIOLOGY | Facility: CLINIC | Age: 38
End: 2023-03-07
Payer: COMMERCIAL

## 2023-03-07 ENCOUNTER — PATIENT MESSAGE (OUTPATIENT)
Dept: FAMILY MEDICINE | Facility: CLINIC | Age: 38
End: 2023-03-07
Payer: COMMERCIAL

## 2023-03-07 VITALS
DIASTOLIC BLOOD PRESSURE: 84 MMHG | BODY MASS INDEX: 31.3 KG/M2 | HEIGHT: 72 IN | WEIGHT: 231.06 LBS | SYSTOLIC BLOOD PRESSURE: 120 MMHG

## 2023-03-07 DIAGNOSIS — R42 DIZZY: Primary | ICD-10-CM

## 2023-03-07 DIAGNOSIS — J30.81 ALLERGIC RHINITIS DUE TO ANIMAL HAIR AND DANDER: ICD-10-CM

## 2023-03-07 DIAGNOSIS — G44.229 CHRONIC TENSION-TYPE HEADACHE, NOT INTRACTABLE: ICD-10-CM

## 2023-03-07 PROCEDURE — 99214 OFFICE O/P EST MOD 30 MIN: CPT | Mod: S$GLB,,, | Performed by: STUDENT IN AN ORGANIZED HEALTH CARE EDUCATION/TRAINING PROGRAM

## 2023-03-07 PROCEDURE — 3008F PR BODY MASS INDEX (BMI) DOCUMENTED: ICD-10-PCS | Mod: CPTII,S$GLB,, | Performed by: STUDENT IN AN ORGANIZED HEALTH CARE EDUCATION/TRAINING PROGRAM

## 2023-03-07 PROCEDURE — 3074F PR MOST RECENT SYSTOLIC BLOOD PRESSURE < 130 MM HG: ICD-10-PCS | Mod: CPTII,S$GLB,, | Performed by: STUDENT IN AN ORGANIZED HEALTH CARE EDUCATION/TRAINING PROGRAM

## 2023-03-07 PROCEDURE — 3079F DIAST BP 80-89 MM HG: CPT | Mod: CPTII,S$GLB,, | Performed by: STUDENT IN AN ORGANIZED HEALTH CARE EDUCATION/TRAINING PROGRAM

## 2023-03-07 PROCEDURE — 3008F BODY MASS INDEX DOCD: CPT | Mod: CPTII,S$GLB,, | Performed by: STUDENT IN AN ORGANIZED HEALTH CARE EDUCATION/TRAINING PROGRAM

## 2023-03-07 PROCEDURE — 3079F PR MOST RECENT DIASTOLIC BLOOD PRESSURE 80-89 MM HG: ICD-10-PCS | Mod: CPTII,S$GLB,, | Performed by: STUDENT IN AN ORGANIZED HEALTH CARE EDUCATION/TRAINING PROGRAM

## 2023-03-07 PROCEDURE — 1159F PR MEDICATION LIST DOCUMENTED IN MEDICAL RECORD: ICD-10-PCS | Mod: CPTII,S$GLB,, | Performed by: STUDENT IN AN ORGANIZED HEALTH CARE EDUCATION/TRAINING PROGRAM

## 2023-03-07 PROCEDURE — 99214 PR OFFICE/OUTPT VISIT, EST, LEVL IV, 30-39 MIN: ICD-10-PCS | Mod: S$GLB,,, | Performed by: STUDENT IN AN ORGANIZED HEALTH CARE EDUCATION/TRAINING PROGRAM

## 2023-03-07 PROCEDURE — 3074F SYST BP LT 130 MM HG: CPT | Mod: CPTII,S$GLB,, | Performed by: STUDENT IN AN ORGANIZED HEALTH CARE EDUCATION/TRAINING PROGRAM

## 2023-03-07 PROCEDURE — 99999 PR PBB SHADOW E&M-EST. PATIENT-LVL III: ICD-10-PCS | Mod: PBBFAC,,, | Performed by: STUDENT IN AN ORGANIZED HEALTH CARE EDUCATION/TRAINING PROGRAM

## 2023-03-07 PROCEDURE — 99999 PR PBB SHADOW E&M-EST. PATIENT-LVL III: CPT | Mod: PBBFAC,,, | Performed by: STUDENT IN AN ORGANIZED HEALTH CARE EDUCATION/TRAINING PROGRAM

## 2023-03-07 PROCEDURE — 1159F MED LIST DOCD IN RCRD: CPT | Mod: CPTII,S$GLB,, | Performed by: STUDENT IN AN ORGANIZED HEALTH CARE EDUCATION/TRAINING PROGRAM

## 2023-03-07 NOTE — PROGRESS NOTES
"Otolaryngology Clinic Note    Subjective:       Patient ID: Brian Gonzalez is a 37 y.o. male.    Chief Complaint: Follow-up and Dizziness    1/26/23:   History of Present Illness: Brian Gonzalez is a 37 y.o. male presenting with dizziness for 1-2 years. Saw PCP and was referred. Also noted anxiety. Dora-Hallpike was negative.   He reports looking down and then up will trigger dizzy and nauseous. Happening weekly, some worse days. Is intermittent in a day, will go to sleep when it's bad. Not room spinning. Kind of like head catching up. Will happen if he looks under something. Rapid motion/car does not trigger. No issues at amusement park this summer. No head trauma. Gets headaches a lot. Has headaches when dizzy. No real light sensitivity, no sounds sensitivity. Seems frontal. Does not correlate sinus symptoms to headaches or dizziness. Anxiety is ok, thinks dizziness triggers anxiety. Paternal grandmother has migraines. Vision is fine, no changes with this. No changes with hearing when this happens, ringing comes and goes, not with dizzy. Ears do not feel full but are itchy. Takes tylenol for headaches which helps. No nsaids. Drinks 3-4 non caffeine drinks a day. No caffeine. Not active, no exercise. Has some BP issues, diet controlled/lost weight. Non smoker. Tried meclizine, felt weird, triggered anxiety. Not sure if it helped with dizziness.     Also reports sinus/allergy issues, seasonal, temperature changes. Gets congestion, pressure, runny nose, PND. Denies hyposmia. Assuming he has cat allergy, lives with cats. Feels like this is all the time. Itchy eyes with cat only and sneezing. Uses zyrtec PRN. Helps with itching but not the other symptoms. Has used flonase in past. May have used astelin but not sure. Does not do sinus rinses.     Saw PCP 11/2/22: "Of concern today is dizziness/off balance feeling. He will get this if he turns his head certain ways - notes if he is looking up at a television and " "turns his head or if he bends over to look under a piece of furniture. No strong sense of spinning, more so a feeling of balance. Episodes can last up to 30 minutes, will have to lie down or sleep until they are over. Has occasional tinnitus. No ear pain."     3/7/23: Dizziness not improved but sinuses better, jey at night. Still getting headaches over eyes, vary, has to go sleep sometimes to help, can last an hour with meds. Meds helped with really dry nose at night and sprays seem to help. No runny nose. Some PND still. Headaches and dizziness seem to be different times. Dizziness happens most days, multiple times a day, lasting seconds. Tylenol helps with headache best. Advil less effective. No hearing changes. Has not tried zyrtec.     Past Surgical History:   Procedure Laterality Date    FOREARM FRACTURE SURGERY Left 2000    FOREARM SURGERY Right 1995    Hook removal     Past Medical History:   Diagnosis Date    Anxiety      Social Determinants of Health     Tobacco Use: Low Risk     Smoking Tobacco Use: Never    Smokeless Tobacco Use: Never    Passive Exposure: Not on file   Alcohol Use: Not on file   Financial Resource Strain: Not on file   Food Insecurity: Not on file   Transportation Needs: Not on file   Physical Activity: Not on file   Stress: Not on file   Social Connections: Not on file   Housing Stability: Not on file   Depression: Low Risk     Last PHQ Score: 0     Review of patient's allergies indicates:  No Known Allergies  Current Outpatient Medications   Medication Instructions    azelastine (ASTELIN) 137 mcg, Nasal, 2 times daily    cetirizine (ZYRTEC) 5 mg, Oral, Daily PRN    fluticasone propionate (FLONASE) 50 mcg, Each Nostril, 2 times daily    pantoprazole (PROTONIX) 40 mg, Oral, Daily         ENT ROS negative except as stated above.           Objective:      Vitals:    03/07/23 0826   BP: 120/84       General: NAD, well appearing  Eyes: Normal conjunctiva and lids  Face: symmetric, nerve " intact  Nose: The nose is without any evidence of any deformity. The nasal mucosa is moist. The septum is midline. There is no evidence of septal hematoma. The turbinates are without abnormality. Can see patent [facility] BL, no polyps.   Ears: The ears are with normal-appearing pinna. Examination of the canals is normal appearing bilaterally. There is no drainage or erythema noted. The tympanic membranes are normal appearing with pearly color, normal-appearing landmarks and normal light reflex. Hearing is grossly intact.  Mouth: No obvious abnormalities to the lips. The teeth are unremarkable. The gingivae are without any obvious evidence of infection or lesion. The oral mucosa is moist and pink. There are no obvious masses to the hard or soft palate.   Oropharynx: The uvula is midline.  The tongue is midline. The posterior pharynx is without erythema or exudate. The tonsils are normal appearing.  Salivary glands: The salivary glands are symmetric and not enlarged, no masses  Neck: No lymphadenopathy, trachea midline, thryoid not enlarged.  Psych: Normal mood and affect.   Neuro: Grossly intact  Speech: fluent       Assessment and Plan:       1. Dizzy    2. Allergic rhinitis due to animal hair and dander    3. Chronic tension-type headache, not intractable          DIZZINESS-   Dizziness is an extremely complex problem that may arise from many sources.  I requires the coordination between the visual system, the vestibular system as well as the proprioreceptive system.  Additionally, balance is compromised in the setting of musculoskeletal, cerebral, cardiac, and numerous physiologic disorders.  The complex interplay between these systems may also lead to dizziness if there is dysynchrony between the bilateral vestibular symptoms.    Central vestibular symptoms can generally be distinguished from peripheral vestibulopathies by the presence of other non vestibular neurologic symptoms (focal weakness, headache), light  headedness (rather than true vertigo), near syncope, weak limbs, panic, fuzziness/cloudiness in mentation, and clumsiness.  Peripheral vestibulopathies are generally, at some point during their course, characterized by a true vertiginous sensation of movement, difficulty with sudden head movements, nausea, difficulty with sudden head movement, and possibly oscicllopsia.    BPPV is the most common cause of episodic vertigo.  Symptoms generally last for seconds then resolve when motionless, otitic symptoms are absent and may be provoked with sudden head motion.  This may occur spontaneously, but frequently follow head trauma or recent vestibular neuronitis.  Nystagmus is typically geotropic and directed toward the affected ear (hyperactive input to the inferior vestibular nerve via the Singular nerve). Canalith repositioning maneuvers are the method of choice for treating this condition.  Mild imbalance following is common and may last 1-2 weeks.    Vestibular neuronitis and labyrinthitis can be distinguished by the presence of sensorineural hearing loss in labyrinthitis, but during their active phase, vertigo is constant.   MRI may be necessary during this phase to exclude CNS pathology.  Frequently this is preceded by a viral illness. Both result in permanent partial end organ weakness of the affected vestibular nerve (usually superior).  Otherwise, they are essentially the same.  The early (vertiginous, 1-3 days) phase is characterized by acute onset of vertigo that is essentially constant and present even in the absence of motion.  Nystagmus is directed away from the affected ear (hypoactive).  In the acute phase, steroids, limited use of vestibular suppressants and hydration are the most effective treatment. Patients then enter the second phase of uncompensated vestibulopathy where they have a general sense of imbalance.  The duration of this phase depends on several factors, but is generally delayed in the  presence of vestibular suppressants, advanced age, central/systemic balance issues and sessile behavior.   Phase 3 is compensated vestibulopathy where symptoms generally only occur with sudden head movements and can be seen with catch up saccades on head thrust.   However, in the presence of bilateral VN, oscillopsia is the hallmark of the disease and compensation is frequently very delayed and poor.    Other causes of vertigo that are typically constant are vestibulotoxic medications and autoimmune inner ear disease and these are generally bilateral in nature.    Meniere's disease is typically (85%) unilateral and defined by 2 spontaneous vertigo attacks lasting 20 min or more, documented hearing loss (typically low tone, frequently fluctuating) and otic fullness or tinnitus in the affected ear. However, the presence of endolymphatic hydrops may result in similar symptoms, not meeting these strict criteria.  This disease can be difficult to distinguish from vestibular migraines, which are not always associated with headaches.    Superior canal dehiscence presents with episodic vertigo lasting seconds to minutes, aural fullness, autophony, hyperacusis and tinnitus (jey pulsatile).  Aural pressure is the most common presenting symptom.  Symptoms can be provoked with Valsalva.  Bone conduction thresholds are supranormal.    Perilymph fistula presents with fluctuating hearing loss, episodic vertigo lasting seconds to minutes and frequently is associated with prior barotrauma or otologic surgery.  Conservative measures such as stool softeners and bedrest frequently lead to resolution.  In cases of persistent symptoms, unfortunately there is no noninvasive diagnostic test with high specificity, and surgical exploration is sometimes necessary when this is expected.    Vestibular schwannomas may have constant or episodic vertigo, frequently SNHL and sometimes may be accompanied by headache or facial numbness.    The  diagnosis and options of management were discussed at length with the patient, including hearing, balance function and the risks associated with my recommendations. We spent a considerable amount of time discussing strategies to cope with dizziness. I emphasized the great importance of fall avoidance and activities that may be dangerous if Brian has an episode of dizziness.  I answered all questions in layman's terms. Based on the history, physical exam and imaging studies there is little evidence of a vestibular dysfunction.  I recommend trial sinus regimen then can consider vestibular migraines.        Allergy concerns: Did not try zyrtec- do this for 2 weeks and see if helping. Continue flonase and astelin twice day since helping  Try nsaids for headaches, make sure well hydrated.     Can consider VNG, MRI, TCA.    Suspect migraines still, cannot rule out sinus disease in frontal area.   Would start with VNG, CT to assess balance systems and possible frontal sinus disease.   Will also refer to neurology to discuss medication options, He defers TCA for now for more workup.     RTC: will call with results of VNG and CT. If sinus issues, can follow up with us, if not, defer to neurology.     Plan of care was discussed in detail with the patient, who agreed with the plan as above. All questions were answered in detail.     Minerva Gaytan MD  Otolaryngology

## 2023-03-09 ENCOUNTER — PATIENT MESSAGE (OUTPATIENT)
Dept: OTOLARYNGOLOGY | Facility: CLINIC | Age: 38
End: 2023-03-09
Payer: COMMERCIAL

## 2023-03-14 ENCOUNTER — OFFICE VISIT (OUTPATIENT)
Dept: NEUROLOGY | Facility: CLINIC | Age: 38
End: 2023-03-14
Payer: COMMERCIAL

## 2023-03-14 VITALS
RESPIRATION RATE: 20 BRPM | SYSTOLIC BLOOD PRESSURE: 127 MMHG | BODY MASS INDEX: 31.99 KG/M2 | WEIGHT: 235.88 LBS | HEART RATE: 74 BPM | DIASTOLIC BLOOD PRESSURE: 87 MMHG

## 2023-03-14 DIAGNOSIS — F45.8 BRUXISM: ICD-10-CM

## 2023-03-14 DIAGNOSIS — G43.009 MIGRAINE WITHOUT AURA AND WITHOUT STATUS MIGRAINOSUS, NOT INTRACTABLE: ICD-10-CM

## 2023-03-14 DIAGNOSIS — F41.9 ANXIETY: ICD-10-CM

## 2023-03-14 DIAGNOSIS — R42 DIZZY: ICD-10-CM

## 2023-03-14 DIAGNOSIS — M79.18 CERVICAL MYOFASCIAL PAIN SYNDROME: ICD-10-CM

## 2023-03-14 DIAGNOSIS — G43.809 VESTIBULAR MIGRAINE: Primary | ICD-10-CM

## 2023-03-14 DIAGNOSIS — G44.229 CHRONIC TENSION-TYPE HEADACHE, NOT INTRACTABLE: ICD-10-CM

## 2023-03-14 PROCEDURE — 1160F PR REVIEW ALL MEDS BY PRESCRIBER/CLIN PHARMACIST DOCUMENTED: ICD-10-PCS | Mod: CPTII,S$GLB,, | Performed by: NURSE PRACTITIONER

## 2023-03-14 PROCEDURE — 99999 PR PBB SHADOW E&M-EST. PATIENT-LVL IV: CPT | Mod: PBBFAC,,, | Performed by: NURSE PRACTITIONER

## 2023-03-14 PROCEDURE — 3008F BODY MASS INDEX DOCD: CPT | Mod: CPTII,S$GLB,, | Performed by: NURSE PRACTITIONER

## 2023-03-14 PROCEDURE — 3074F PR MOST RECENT SYSTOLIC BLOOD PRESSURE < 130 MM HG: ICD-10-PCS | Mod: CPTII,S$GLB,, | Performed by: NURSE PRACTITIONER

## 2023-03-14 PROCEDURE — 99204 PR OFFICE/OUTPT VISIT, NEW, LEVL IV, 45-59 MIN: ICD-10-PCS | Mod: S$GLB,,, | Performed by: NURSE PRACTITIONER

## 2023-03-14 PROCEDURE — 99204 OFFICE O/P NEW MOD 45 MIN: CPT | Mod: S$GLB,,, | Performed by: NURSE PRACTITIONER

## 2023-03-14 PROCEDURE — 1159F MED LIST DOCD IN RCRD: CPT | Mod: CPTII,S$GLB,, | Performed by: NURSE PRACTITIONER

## 2023-03-14 PROCEDURE — 3079F DIAST BP 80-89 MM HG: CPT | Mod: CPTII,S$GLB,, | Performed by: NURSE PRACTITIONER

## 2023-03-14 PROCEDURE — 3074F SYST BP LT 130 MM HG: CPT | Mod: CPTII,S$GLB,, | Performed by: NURSE PRACTITIONER

## 2023-03-14 PROCEDURE — 3008F PR BODY MASS INDEX (BMI) DOCUMENTED: ICD-10-PCS | Mod: CPTII,S$GLB,, | Performed by: NURSE PRACTITIONER

## 2023-03-14 PROCEDURE — 3079F PR MOST RECENT DIASTOLIC BLOOD PRESSURE 80-89 MM HG: ICD-10-PCS | Mod: CPTII,S$GLB,, | Performed by: NURSE PRACTITIONER

## 2023-03-14 PROCEDURE — 99999 PR PBB SHADOW E&M-EST. PATIENT-LVL IV: ICD-10-PCS | Mod: PBBFAC,,, | Performed by: NURSE PRACTITIONER

## 2023-03-14 PROCEDURE — 1160F RVW MEDS BY RX/DR IN RCRD: CPT | Mod: CPTII,S$GLB,, | Performed by: NURSE PRACTITIONER

## 2023-03-14 PROCEDURE — 1159F PR MEDICATION LIST DOCUMENTED IN MEDICAL RECORD: ICD-10-PCS | Mod: CPTII,S$GLB,, | Performed by: NURSE PRACTITIONER

## 2023-03-14 RX ORDER — TIZANIDINE 4 MG/1
TABLET ORAL
Qty: 30 TABLET | Refills: 11 | Status: SHIPPED | OUTPATIENT
Start: 2023-03-14

## 2023-03-14 RX ORDER — ZONISAMIDE 100 MG/1
100 CAPSULE ORAL NIGHTLY
Qty: 30 CAPSULE | Refills: 11 | Status: SHIPPED | OUTPATIENT
Start: 2023-03-14 | End: 2024-03-13

## 2023-03-14 RX ORDER — RIZATRIPTAN BENZOATE 10 MG/1
TABLET ORAL
Qty: 18 TABLET | Refills: 11 | Status: SHIPPED | OUTPATIENT
Start: 2023-03-14 | End: 2023-08-16 | Stop reason: ALTCHOICE

## 2023-03-14 NOTE — PATIENT INSTRUCTIONS
Please call our clinic at [phone] or send a message on the [facility] portal if there are any changes to the plan described below, for example,if you are not contacted for the requested tests, referral(s) within one week, if you are unable to receive the medications prescribed, or if you feel you need to change the treatment course for any reason.     TESTING:  -- none at this time    REFERRALS:  -- physical therapy    PREVENTION (use daily regardless of headache):  -- START zonisamide at bedtime (may need to drink electrolyte replacement daily to combat side effects)  -- start magnesium in ONE of the following preparations -               1. Magnesium oxide 800mg daily (the most common over the counter kind, may causes loose stools)              2. Magnesium citrate 400-500mg daily (harder to find, but more neutral on the bowels)              3. Magnesium glycinate 400mg daily (hardest to find, look online, but most bowel-neutral, best absorbed)     AS-NEEDED TREATMENT (use total no more than 10 days per month unless otherwise stated):  -- START rizatriptan with next migraine attack (escalation of headache). You can repeat two hours later if needed  -- START tizanidine at night. This is a muscle relaxer and it will also make you potentially sleepy. Start with half a tablet to see how you respond but can take up to a whole tablet if needed

## 2023-03-14 NOTE — PROGRESS NOTES
"Date of service: 3/14/2023  Referring provider: Dr. Minerva Gaytan    Subjective:      Chief complaint: Headache and Dizziness       Patient ID: Brian Gonzalez is a 37 y.o. male with anxiety, dizziness who presents for new patient evaluation of headache     History of Present Illness    ORIGINAL HEADACHE HISTORY - 3/14/23  Age at onset and course over time: many years, vertigo for past year    He has been evaluated by ENT who suspects migraine origin. Allergy medication has not helped headaches.     Family history of migraines- paternal grandmother, father  Family history of aneurysm - none  Last eye exam - "long time ago"  Location: front, sides   Quality:  [x] pressure [] tight [x] throbbing [] sharp [] stabbing   Severity: current 2 with range 1-8  Duration: hours  Frequency: near daily, escalation to severe about once per week   Headaches awaken at night?:  no  Worst time of day: evening   Associated with: [] photophobia []  phonophobia [] osmophobia [] blurred vision  [] double vision [] loss of appetite [] nausea [] vomiting [] dizziness [x] vertigo  [] tinnitus [] irritability [x] sinus pressure [] problems with concentration   [x] neck tightness   Alleviated by:  [x] sleep [] darkness [] massage [x] heat [] ice [x] medication  Exacerbated by:  [x] fatigue [] light [] noise [] smells [] coughing [] sneezing  [] bending over [] ovulation [] menses [] alcohol [x] change in weather [x]  stress  Ipsilateral autonomic: [] nasal congestion [] lacrimation [] ptosis [] injection [] edema [] foreign body sensation [] ear fullness   ICP:  [] transient visual obscurations  [x] tinnitus high pitched  [] positional headache  [x] non-positional   Sleep habits: trouble falling asleep, had sleep study years ago and "no significant data for sleep apnea"   Caffeine intake: none   HIT 6 - 50    Current acute treatment:  Advil - 4-5 days per week    Current prevention:  None    Previously tried/failed acute " "treatment:  Meclizine  Ibuprofen  Zofran  Excedrin  Tylenol - "made headache go away faster"    Previously tried/failed preventative treatment:  None     Review of patient's allergies indicates:  No Known Allergies  Current Outpatient Medications   Medication Sig Dispense Refill    azelastine (ASTELIN) 137 mcg (0.1 %) nasal spray 1 spray (137 mcg total) by Nasal route 2 (two) times daily. 30 mL 11    cetirizine (ZYRTEC) 5 MG tablet Take 5 mg by mouth daily as needed.      fluticasone propionate (FLONASE) 50 mcg/actuation nasal spray 1 spray (50 mcg total) by Each Nostril route 2 (two) times a day. 16 g 11    pantoprazole (PROTONIX) 40 MG tablet Take 1 tablet (40 mg total) by mouth once daily. (Patient not taking: Reported on 3/14/2023) 14 tablet 0    rizatriptan (MAXALT) 10 MG tablet 1 tab PO PRN migraine. May repeat every 2 hours for max 3 tabs in 24 hours. Use no more than 10 days per month. 18 tablet 11    tiZANidine (ZANAFLEX) 4 MG tablet Half or full tablet by mouth at night as needed for muscle spasm 30 tablet 11    zonisamide (ZONEGRAN) 100 MG Cap Take 1 capsule (100 mg total) by mouth every evening. 30 capsule 11     No current facility-administered medications for this visit.       Past Medical History  Past Medical History:   Diagnosis Date    Anxiety        Past Surgical History  Past Surgical History:   Procedure Laterality Date    FOREARM FRACTURE SURGERY Left 2000    FOREARM SURGERY Right 1995    Hook removal       Family History  Family History   Problem Relation Age of Onset    Breast cancer Mother 50    Diabetes Maternal Grandmother     Breast cancer Maternal Aunt     Ovarian cancer Maternal Aunt     Heart disease Neg Hx        Social History  Social History     Socioeconomic History    Marital status:    Occupational History    Occupation:    Tobacco Use    Smoking status: Never    Smokeless tobacco: Never   Substance and Sexual Activity    Alcohol use: No    Drug use: No "   Social History Narrative    Lives with wife. Two cats. 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         Review of Systems  14-point review of systems as follows:   No check rishi indicates NEGATIVE response   Constitutional: [] weight loss, [] change to appetite   Eyes: [] change in vision, [] double vision   Ears, nose, mouth, throat: [] frequent nose bleeds, [x] ringing in the ears   Respiratory: [] cough, [] wheezing   Cardiovascular: [] chest pain, [] palpitations   Gastrointestinal: [] jaundice, [] nausea/vomiting   Genitourinary: [] incontinence, [] burning with urination   Hematologic/lymphatic: [] easy bruising/bleeding, [] night sweats   Neurological: [] numbness, [] weakness   Endocrine: [x] fatigue, [] heat/cold intolerance   Allergy/Immunologic: [] fevers, [] chills   Musculoskeletal: [] muscle pain, [] joint pain   Psychiatric: [] thoughts of harming self/others, [] depression   Integumentary: [] rashes, [] sores that do not heal     Objective:        Vitals:    03/14/23 0825   BP: 127/87   Pulse: 74   Resp: 20     Body mass index is 31.99 kg/m².    3/14/23  Constitutional: appears in no acute distress, well-developed, well-nourished     Eyes: normal conjunctiva, PERRLA    Ears, nose, mouth, throat: external appearance of ears and nose normal, hearing intact, tongue scalloping      Cardiovascular: regular rate and rhythm, no murmurs appreciated    Respiratory: unlabored respirations, breath sounds normal bilaterally    Gastrointestinal: no visible abdominal masses, no guarding, no visible hernia    Musculoskeletal: normal tone in all four extremities. No abnormal movements. No pronator drift. No orbit. Symmetric finger tapping. Normal station. Normal regular gait. Normal tandem gait.      Spine:   CERVICAL SPINE:  ROM: normal   MUSCLE SPASM: bilateral    FACET LOADING: no   SPURLING: no  NORIS / HERNANDEZ tender: no     Psychiatric: normal judgment and insight. Oriented to person, place, and time.     Neurologic:    Cortical functions: recent and remote memory intact, normal attention span and concentration, speech fluent, adequate fund of knowledge   Cranial nerves: visual fields full, PERRLA, EOMI, symmetric facial strength, hearing intact, palate elevates symmetrically, shoulder shrug 5/5, tongue protrudes midline   Reflexes: 2+ in the upper and lower extremities, no Montiel  Sensation: intact to temperature throughout   Coordination: normal finger to nose, heel to shin  Data Review:     I have personally reviewed the referring provider's notes, labs, & imaging made available to me today.      RADIOLOGY STUDIES:  I have personally reviewed the pertinent images performed.       Results for orders placed or performed during the hospital encounter of 06/21/19   CT Head Without Contrast    Narrative    EXAMINATION:  CT HEAD WITHOUT CONTRAST    CLINICAL HISTORY:  Headache, positional; headache,right side weakness    TECHNIQUE:  Low dose axial images were obtained through the head.  Coronal and sagittal reformations were also performed. Contrast was not administered.    Dose (DLP): 550 mGycm    COMPARISON:  None.    FINDINGS:  INTRACRANIAL: Brain parenchyma demonstrates normal attenuation and configuration.  No acute intracranial hemorrhage.  No hydrocephalus.  No intracranial mass effect.    SINUSES: Visualized paranasal sinuses and mastoids are clear.    SKULL/SCALP: Visualized osseous structures are normal.    ORBITS: Visualized orbits are normal.      Impression    No acute intracranial abnormality.      Electronically signed by: Sha Tubbs  Date:    06/21/2019  Time:    10:39       Lab Results   Component Value Date     11/10/2022    K 4.5 11/10/2022     11/10/2022    CO2 26 11/10/2022    BUN 8 11/10/2022    CREATININE 1.0 11/10/2022    GLU 91 11/10/2022    HGBA1C 5.2 07/15/2010    AST 20 11/10/2022    ALT 34 11/10/2022    ALBUMIN 4.4 11/10/2022    PROT 8.5 (H) 11/10/2022    BILITOT 0.7 11/10/2022    CHOL  185 11/02/2022    HDL 36 (L) 11/02/2022    LDLCALC 123.8 11/02/2022    TRIG 126 11/02/2022       Lab Results   Component Value Date    WBC 5.46 11/10/2022    HGB 15.7 11/10/2022    HCT 45.7 11/10/2022    MCV 85 11/10/2022     11/10/2022       Lab Results   Component Value Date    TSH 1.134 12/13/2011           Assessment & Plan:       Problem List Items Addressed This Visit          Neuro    Vestibular migraine - Primary    Relevant Medications    zonisamide (ZONEGRAN) 100 MG Cap    rizatriptan (MAXALT) 10 MG tablet    Migraine without aura and without status migrainosus, not intractable    Overview     Migraine headaches since at least high school with strong family history. Headaches are typically unilateral, moderate to severe in intensity, worsen with activity, pounding in quality. Gradual progression pattern, lack of red flag features on history, and normal neurological exam are reassuring for primary as opposed to secondary etiology of headaches thus imaging will not be pursued for this history and this exam at this time.     He does have a strong dizziness component associated with his headaches leading to believe these may be vestibular migraines. Will trial zonisamide for prevention.    Add triptan for acute attacks.            Relevant Medications    zonisamide (ZONEGRAN) 100 MG Cap    rizatriptan (MAXALT) 10 MG tablet    Other Relevant Orders    Ambulatory referral/consult to Physical/Occupational Therapy       Psychiatric    Anxiety    Current Assessment & Plan     Currently not treated. Avoid Depakote as this can worsen mood issues.           Other Visit Diagnoses       Dizzy        Chronic tension-type headache, not intractable        Cervical myofascial pain syndrome        Relevant Medications    tiZANidine (ZANAFLEX) 4 MG tablet    Other Relevant Orders    Ambulatory referral/consult to Physical/Occupational Therapy    Bruxism        Relevant Medications    tiZANidine (ZANAFLEX) 4 MG tablet     Other Relevant Orders    Ambulatory referral/consult to Physical/Occupational Therapy                Please call our clinic at [phone] or send a message on the [facility] portal if there are any changes to the plan described below, for example,if you are not contacted for the requested tests, referral(s) within one week, if you are unable to receive the medications prescribed, or if you feel you need to change the treatment course for any reason.     TESTING:  -- none at this time    REFERRALS:  -- physical therapy    PREVENTION (use daily regardless of headache):  -- START zonisamide at bedtime  -- start magnesium in ONE of the following preparations -               1. Magnesium oxide 800mg daily (the most common over the counter kind, may causes loose stools)              2. Magnesium citrate 400-500mg daily (harder to find, but more neutral on the bowels)              3. Magnesium glycinate 400mg daily (hardest to find, look online, but most bowel-neutral, best absorbed)     AS-NEEDED TREATMENT (use total no more than 10 days per month unless otherwise stated):  -- START rizatriptan with next migraine attack (escalation of headache). You can repeat two hours later if needed  -- START tizanidine at night. This is a muscle relaxer and it will also make you potentially sleepy. Start with half a tablet to see how you respond but can take up to a whole tablet if needed      Follow up in about 2 months (around 5/14/2023) for follow up with [facility].    Tea Alberts NP

## 2023-03-22 ENCOUNTER — CLINICAL SUPPORT (OUTPATIENT)
Dept: REHABILITATION | Facility: HOSPITAL | Age: 38
End: 2023-03-22
Payer: COMMERCIAL

## 2023-03-22 DIAGNOSIS — R42 DIZZY SPELLS: ICD-10-CM

## 2023-03-22 DIAGNOSIS — M79.18 CERVICAL MYOFASCIAL PAIN SYNDROME: ICD-10-CM

## 2023-03-22 DIAGNOSIS — F45.8 BRUXISM: ICD-10-CM

## 2023-03-22 DIAGNOSIS — M53.82 DECREASED ROM OF INTERVERTEBRAL DISCS OF CERVICAL SPINE: ICD-10-CM

## 2023-03-22 DIAGNOSIS — G43.009 MIGRAINE WITHOUT AURA AND WITHOUT STATUS MIGRAINOSUS, NOT INTRACTABLE: ICD-10-CM

## 2023-03-22 DIAGNOSIS — M54.2 NECK PAIN: ICD-10-CM

## 2023-03-22 PROCEDURE — 97161 PT EVAL LOW COMPLEX 20 MIN: CPT | Mod: PO | Performed by: PHYSICAL THERAPIST

## 2023-03-22 NOTE — PLAN OF CARE
[facility]  Physical Therapy Initial Evaluation    Name: Brian Gonzalez  Clinic Number: [id number]    Therapy Diagnosis:   Encounter Diagnoses   Name Primary?    Migraine without aura and without status migrainosus, not intractable     Cervical myofascial pain syndrome     Bruxism     Neck pain     Decreased ROM of intervertebral discs of cervical spine     Dizzy spells      Physician: Tea Alberts, NP    Physician Orders: PT Eval and Treat   Medical Diagnosis from Referral:   Migraine without aura and without status migrainosus, not intractable   Cervical myofascial pain syndrome  Bruxism   Evaluation Date: 3/22/2023  Authorization Period Expiration: 04/22/2023  Plan of Care Expiration: 05/19/2023  Visit # / Visits authorized: 1    Time In: 1330  Time Out: 1430  Total Billable Time: 60 minutes    Precautions: Standard    Subjective   Date of onset: 03/14/2023  History of current condition - Brian reports: having ongoing history of migraines since teenager with neck pain, stiffness, dizziness associated with neck movements, vertigo in his past with looking downwards. No drop attacks. No radicular pain. No numbness/tingling noted.        Past Medical History:   Diagnosis Date    Anxiety      Brian Gonzalez  has a past surgical history that includes Forearm surgery (Right, 1995) and Forearm fracture surgery (Left, 2000).    Brian has a current medication list which includes the following prescription(s): azelastine, cetirizine, fluticasone propionate, pantoprazole, rizatriptan, tizanidine, and zonisamide.    Review of patient's allergies indicates:  No Known Allergies     Imaging, :   1. Grossly stable size lesion within the right hepatic lobe which is nonspecific by ultrasound but previously characterized as a potential hemangioma by MRI and CT.  No additional liver lesions appreciated.  No acute process.     Prior Therapy: none  Social History:  lives with their  family  Occupation:    Work demands: seated work  Leisure activities: computer software  Prior Level of Function: independent  Current Level of Function/limitation: modified independent, increase time noted with home management  Recent or major surgery: none noted  Accidents: none noted    Pain:  Current 3/10, worst 6/10, best 0/10   Location: bilateral neck  Description: Aching, Dull, Tight, and Variable  Constant symptoms: none  Intermittent symptoms: yes  Worse: Sitting  Better:  active      Disturbed sleep: no  Is it positional? no  Unexplained weight loss: no    Pts goals: Improve posture, decreased neck pain and work on on dizziness/vertigo as well.    Objective     SYSTEMS SCREEN    - Follows commands: 100% of time   - Speech: no deficits     Mental status: alert, oriented to person, place, and time, normal mood, behavior, speech, dress, motor activity, and thought processes  Appearance: Casually dressed  Behavior:  calm and cooperative  Attention Span and Concentration:  Normal    Posture observation:  Sitting: lordotic/neutral/kyphotic: kypotic, cervical retraction  Change in posture: better/worse/same: better  Standing: lordotic/neutral/kyphotic: neutral                               Lateral shift: right/left/nil: nil  Shift relevant: yes/no: no    Sensation: Light Touch: Intact          Proprioception: Intact          Vibratory sense intact     Strength: manual muscle test grades below   Upper Extremity Strength: WNL   Lower Extremity Strength: WNL      ROM: CERVICAL SPINE  Sensation: Dermatomes:   Right Left Comment   C2/C3 (posterior head/neck) intact intact    C4 intact intact    C5 intact intact    C6 intact intact    C7 intact intact    C8 intact intact    T1 intact intact      DTR:   Right Left Comment   Biceps (C5-6) 2+ 2+    Triceps (C7) 2+ 2+                                                                                                                                             "                                                                                        Cervical ROM: Inclinometer/Goniometer/%                            Pain/dysfunction/movement  Flexion  Protraction  Retraction 0  0  50% Can't touch sternum to chin. Non-uniform curvature   Extension 50% Non-uniform curvature. Not within 10 degrees of parallel of the horizontal line   Lateral flexion (L)     Lateral flexion (R)     Right rotation 60 Chin/nose not in line with mid-clavicle. +/- 80 degrees   Left rotation 60 Chin/nose not in line with mid-clavicle. +/- 80 degrees     OA -  AA -      Modified VAS (Vertebral Artery Screen), in sitting (rotation, then extension 10"):  R: Negative  L: Negative    VESTIBULAR EXAMINATION    Oculomotor Screen in room light (fixation present):   Known eye dysfunction: None   Ocular ROM: WNL    Tracking/Smooth Pursuits: Intact  Saccades: Impaired: left side, 2 beats  Convergence: WNL    Spontaneous Nystagmus: Absent   Gaze Holding Nystagmus: Absent   Head Impulse Test: Positive left side    POSITIONAL CANAL TESTING  Looking for nystagmus (slow phase followed by quick phase to the affected side for BPPV)    Dora Hallpike (posterior / CL anterior)   Right : Negative nystagmus, Negative dizziness   Left: Negative nystagmus, Negative dizziness  Horizontal Canals   Right: Negative nystagmus, Negative dizziness   Left: Negative nystagmus, Negative dizziness       Postural control: MCTSIB: Evaluation 03/22/2023 (Average of 3 trials)   1. Eyes Open/feet together/Firm: 30 seconds   2. Eyes Closed/feet together/Firm:  30 seconds   3. Eyes Open/feet together/Foam:  30 seconds   4. Eyes Closed/feet together/Foam:  30 seconds   TOTAL 120/120   Gait Assessment:(if indicated)  - AD used: none  - Assistance: independent  - Distance: 5    GAIT DEVIATIONS:  Brian displays the following deviations with ambulation: no obvious deviations      CMS Impairment/Limitation/Restriction for FOTO Neck Survey  Status " Limitation G-Code CMS Severity Modifier  Intake 67% 33% Current Status CJ - At least 20 percent but less than 40 percent  Predicted 73% 27% Goal Status+ CJ - At least 20 percent but less than 40 percent       TREATMENT   Treatment Time In: 1400  Treatment Time Out: 1410  Total Treatment time separate from Evaluation: 10 minutes    Brian received therapeutic exercises to develop ROM and posture for 5 minutes including:  Seated ergonomics  Seated: cervical retraction, rotation  Scapular retractions    Home Exercises and Patient Education Provided    Education provided:   - yes    Written Home Exercises Provided: yes.  Exercises were reviewed and Brian was able to demonstrate them prior to the end of the session.  Brian demonstrated good  understanding of the education provided.     See EMR under Patient Instructions for exercises provided 3/22/2023.    Assessment   Brian is a 37 y.o. male referred to outpatient Physical Therapy with a medical diagnosis of Migraine without aura and without status migrainosus, not intractable,Cervical myofascial pain syndrome  . Pt presents with neck pain/stiffness, decreased cervical motion, point tenderness to bilateral UT, levator scap, seated postural dysfunction with adaptive muscle length/shortening, possible vestibular dysfunction.    Problem List: pain, decreased ROM, decreased flexibility, decreased strength, decreased balance and stability, and inability to participate fully in vocation pursuits.    Pt prognosis is Good.   Pt will benefit from skilled outpatient Physical Therapy to address the deficits stated above and in the chart below, provide pt/family education, and to maximize pt's level of independence.     Plan of care discussed with patient: Yes  Pt's spiritual, cultural and educational needs considered and patient is agreeable to the plan of care and goals as stated below:     Anticipated Barriers for therapy: none    Medical Necessity is demonstrated by the  following  History  Co-morbidities and personal factors that may impact the plan of care Co-morbidities:   anxiety    Personal Factors:   no deficits     moderate   Examination  Body Structures and Functions, activity limitations and participation restrictions that may impact the plan of care Body Regions:   head  neck  back  upper extremities  trunk    Body Systems:    ROM  strength  balance  transfers  transitions  motor control    Participation Restrictions:   Home management    Activity limitations:   Learning and applying knowledge  no deficits    General Tasks and Commands  no deficits    Communication  no deficits    Mobility  lifting and carrying objects  walking    Self care  no deficits    Domestic Life  doing house work (cleaning house, washing dishes, laundry)    Interactions/Relationships  no deficits    Life Areas  no deficits    Community and Social Life  no deficits         high   Clinical Presentation stable and uncomplicated low   Decision Making/ Complexity Score: low     Short Term GOALS:  In 4 weeks, pt. will:  - improve cervical retraction by 25% for neutral seated work.  - report > 50% improvement in corrective posture episodes during work related tasks.  - demonstrate seated to standing transition of gaze stabilization up to 1' for vestibular purposes.  - decrease outcome measure limitation to <33%    Long Term GOALS:  In 8 weeks, pt. will:  - be independent and compliant with HEP and SX management   - decrease outcome measure limitation to <30%  - demonstrate cervical retraction to neutral with improvement in extension by 50% for seated work.  - report > 75% reduction in neck pain episodes with home management.  - demonstrate standing to ambulatory gaze tasks independently with no dizzy episodes.    Plan   Plan of care Certification: 3/22/2023 to 05/19/2023.  Outpatient Physical Therapy 2 times weekly for 8 weeks to include the following interventions: Electrical Stimulation dry needling,  Manual Therapy, Moist Heat/ Ice, Neuromuscular Re-ed, Patient Education, Therapeutic Activities, Therapeutic Exercise, and dry needling .      Brian may at times be seen by a PTA as part of the Rehab Team.    Frandy Joshi, PT

## 2023-03-25 ENCOUNTER — PATIENT MESSAGE (OUTPATIENT)
Dept: FAMILY MEDICINE | Facility: CLINIC | Age: 38
End: 2023-03-25
Payer: COMMERCIAL

## 2023-03-25 NOTE — TELEPHONE ENCOUNTER
Called Patient to reschedule 05/03/23 appointment with Dr. Gutierrez. No answer, left voicemail with clinic callback and portal message sent. Appointment cancelled.

## 2023-03-29 ENCOUNTER — CLINICAL SUPPORT (OUTPATIENT)
Dept: REHABILITATION | Facility: HOSPITAL | Age: 38
End: 2023-03-29
Payer: COMMERCIAL

## 2023-03-29 DIAGNOSIS — M53.82 DECREASED ROM OF INTERVERTEBRAL DISCS OF CERVICAL SPINE: ICD-10-CM

## 2023-03-29 DIAGNOSIS — M54.2 NECK PAIN: Primary | ICD-10-CM

## 2023-03-29 DIAGNOSIS — R42 DIZZY SPELLS: ICD-10-CM

## 2023-03-29 NOTE — PROGRESS NOTES
"[facility]   Physical Therapy Treatment Note     Name: Brian Gonzalez  Clinic Number: [id number]    Therapy Diagnosis:   Encounter Diagnoses   Name Primary?    Neck pain Yes    Decreased ROM of intervertebral discs of cervical spine     Dizzy spells      Physician: Tea Alberts NP    Visit Date: 3/29/2023  Physician Orders: PT Eval and Treat   Medical Diagnosis from Referral:   Migraine without aura and without status migrainosus, not intractable   Cervical myofascial pain syndrome  Bruxism   Evaluation Date: 3/22/2023  Authorization Period Expiration: 04/22/2023  Plan of Care Expiration: 05/19/2023  Visit # / Visits authorized: 1     Time In: 1330  Time Out: 1424  Total Billable Time: 43 minutes     Precautions: Standard    SUBJECTIVE     Pt reports: no new s/s from previous session. Patient has started to work on HEP and sleeping patterns as well. No vertigo noted. No dizziness noted..  He was compliant with home exercise program.  Response to previous treatment: muscle soreness  Functional change: too soon to tell    Pain: 0/10  Location: bilateral neck     OBJECTIVE     Objective Measures updated at progress report unless specified.   Cervical ROM: Inclinometer/Goniometer/%                            Pain/dysfunction/movement  Flexion  Protraction  Retraction 0  0  50% Can't touch sternum to chin. Non-uniform curvature   Extension 50% Non-uniform curvature. Not within 10 degrees of parallel of the horizontal line   Lateral flexion (L)       Lateral flexion (R)       Right rotation 60 Chin/nose not in line with mid-clavicle. +/- 80 degrees   Left rotation 60 Chin/nose not in line with mid-clavicle. +/- 80 degrees      OA -  AA -     Modified VAS (Vertebral Artery Screen), in sitting (rotation, then extension 10"):  R: Negative  L: Negative     VESTIBULAR EXAMINATION     Oculomotor Screen in room light (fixation present):   Known eye dysfunction: None   Ocular ROM: WNL  " "  Tracking/Smooth Pursuits: Intact  Saccades: Impaired: left side, 2 beats  Convergence: WNL    Spontaneous Nystagmus: Absent   Gaze Holding Nystagmus: Absent   Head Impulse Test: Positive left side     POSITIONAL CANAL TESTING  Looking for nystagmus (slow phase followed by quick phase to the affected side for BPPV)     Dora Hallpike (posterior / CL anterior)              Right : Negative nystagmus, Negative dizziness              Left: Negative nystagmus, Negative dizziness  Horizontal Canals              Right: Negative nystagmus, Negative dizziness              Left: Negative nystagmus, Negative dizziness         Postural control: MCTSIB: Evaluation 03/22/2023 (Average of 3 trials)   1. Eyes Open/feet together/Firm: 30 seconds   2. Eyes Closed/feet together/Firm:  30 seconds   3. Eyes Open/feet together/Foam:  30 seconds   4. Eyes Closed/feet together/Foam:  30 seconds   TOTAL 120/120   Gait Assessment:(if indicated)  - AD used: none  - Assistance: independent  - Distance: 50  Treatment     Orry received the treatments listed below:      therapeutic exercises to develop strength, endurance, ROM, flexibility, posture, and core stabilization for 23 minutes including:  - UBE 3' fwd/bwd each  - cervical retraction 3/20", f/b 1/10 2" hold  - T-spine rotation x 10 each with cervical rotation  - T-spine extension with clinician over-overpressure x 10  - prone lying on elbows x 3', progressed to extension in lying  x 10  -     neuromuscular re-education activities to improve: Balance, Kinesthetic, Sense, Posture, and vestibular for 20 minutes. The following activities were included:  -VRT: Gaze Stabilization  VOR x 1 horizontal, seated clean background, x 60 sec  (2)    VOR x 1 vertical, seated/standing, clean/busy background x 60 sec    VOR x2: angular accelerations of the head, stabilize gaze, and target moving in opposite directions.    Two-target VOR: gaze shift to target followed by an ipsilateral head " movement    Imaginary Target: gaze held on target, close eyes, move head, open eyes to assess accuracy    Translation VOR: angular accelerations of the head, stabilize gaze on stationary target.      Patient Education and Home Exercises     Home Exercises Provided and Patient Education Provided     Education provided:   - Yes    Written Home Exercises Provided: Patient instructed to cont prior HEP. Exercises were reviewed and Brian was able to demonstrate them prior to the end of the session.  Brian demonstrated good  understanding of the education provided. See EMR under Patient Instructions for exercises provided during therapy sessions    ASSESSMENT     Patient was given instruction with demonstration on TE and NMR noted. Mild dizziness noted but overall good tolerance with progression.    Brian Is progressing well towards his goals.   Pt prognosis is Good.     Pt will continue to benefit from skilled outpatient physical therapy to address the deficits listed in the problem list box on initial evaluation, provide pt/family education and to maximize pt's level of independence in the home and community environment.     Pt's spiritual, cultural and educational needs considered and pt agreeable to plan of care and goals.     Anticipated barriers to physical therapy: none    Goals:   Short Term GOALS:  In 4 weeks, pt. will:  - improve cervical retraction by 25% for neutral seated work.  - report > 50% improvement in corrective posture episodes during work related tasks.  - demonstrate seated to standing transition of gaze stabilization up to 1' for vestibular purposes.  - decrease outcome measure limitation to <33%    PLAN     Continuation of therapy    Frandy Joshi, PT

## 2023-04-05 ENCOUNTER — CLINICAL SUPPORT (OUTPATIENT)
Dept: REHABILITATION | Facility: HOSPITAL | Age: 38
End: 2023-04-05
Payer: COMMERCIAL

## 2023-04-05 DIAGNOSIS — M53.82 DECREASED ROM OF INTERVERTEBRAL DISCS OF CERVICAL SPINE: ICD-10-CM

## 2023-04-05 DIAGNOSIS — M54.2 NECK PAIN: Primary | ICD-10-CM

## 2023-04-05 DIAGNOSIS — R42 DIZZY SPELLS: ICD-10-CM

## 2023-04-05 PROCEDURE — 97140 MANUAL THERAPY 1/> REGIONS: CPT | Mod: PO,CQ

## 2023-04-05 PROCEDURE — 97112 NEUROMUSCULAR REEDUCATION: CPT | Mod: PO,CQ

## 2023-04-05 PROCEDURE — 97110 THERAPEUTIC EXERCISES: CPT | Mod: PO,CQ

## 2023-04-05 NOTE — PROGRESS NOTES
[facility]   Physical Therapy Treatment Note     Name: Brian Gonzalez  Clinic Number: [id number]    Therapy Diagnosis:   Encounter Diagnoses   Name Primary?    Neck pain Yes    Decreased ROM of intervertebral discs of cervical spine     Dizzy spells      Physician: Tea Alberts NP    Visit Date: 4/5/2023  Physician Orders: PT Eval and Treat   Medical Diagnosis from Referral:   Migraine without aura and without status migrainosus, not intractable   Cervical myofascial pain syndrome  Bruxism   Evaluation Date: 3/22/2023  Authorization Period Expiration: 04/22/2023  Plan of Care Expiration: 05/19/2023  Visit # / Visits authorized: 2     Time In: 1440  Time Out: 1424  Total Billable Time: 43 minutes     Precautions: Standard    SUBJECTIVE     Pt reports: He is still having some issues with vertigo.  If he is looking down, looking to the right with his eyes closed, looking down and then looking upward( TV). Pt will feel uncomfortable or having nausea.  Pt reports when he has these episodes, he will usually stop what he is doing or close his eyes.  Pt reports he has some episodes where he has to go lay down because the symptoms are not resolving.  These episodes usually have a headache.  Pt will take tylenol and go to sleep.  Pt reports the cervical distraction seemed to help with his neck pain.  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  Pt notices a difference in his strength in the R arm from the elbow down compared to his L.   He was compliant with home exercise program.  Response to previous treatment: muscle soreness  Functional change: too soon to tell    Pain: 0/10  Location: bilateral neck     OBJECTIVE     Objective Measures updated at progress report unless specified.   Cervical ROM: Inclinometer/Goniometer/%                            Pain/dysfunction/movement  Flexion  Protraction  Retraction  "0  0  50% Can't touch sternum to chin. Non-uniform curvature   Extension 50% Non-uniform curvature. Not within 10 degrees of parallel of the horizontal line   Lateral flexion (L)       Lateral flexion (R)       Right rotation 60 Chin/nose not in line with mid-clavicle. +/- 80 degrees   Left rotation 60 Chin/nose not in line with mid-clavicle. +/- 80 degrees      OA -  AA -     Modified VAS (Vertebral Artery Screen), in sitting (rotation, then extension 10"):  R: Negative  L: Negative     VESTIBULAR EXAMINATION     Oculomotor Screen in room light (fixation present):   Known eye dysfunction: None   Ocular ROM: WNL    Tracking/Smooth Pursuits: Intact  Saccades: Impaired: left side, 2 beats  Convergence: WNL    Spontaneous Nystagmus: Absent   Gaze Holding Nystagmus: Absent   Head Impulse Test: Positive left side     POSITIONAL CANAL TESTING  Looking for nystagmus (slow phase followed by quick phase to the affected side for BPPV)       Treatment     Orry received the treatments listed below:      therapeutic exercises to develop strength, endurance, ROM, flexibility, posture, and core stabilization for 30 minutes including:  - UBE 3' fwd/bwd each- not performed  - cervical retraction 3/20", f/b 2/10 2" hold  - T-spine rotation x 10 each with cervical rotation  - T-spine extension with clinician over-overpressure x 10  - prone lying on elbows x 3', progressed to extension in lying  x 10  - Shoulder extension- increased sharp pain R from elbow down      manual therapy techniques: Manual traction, Myofacial release, and Soft tissue Mobilization were applied to the: neck for 10 minutes, including:  STM to B UT  Sub occipital release  Gentle cervical traction      neuromuscular re-education activities to improve: Balance, Kinesthetic, Sense, Posture, and vestibular for 15 minutes. The following activities were included:    Supine chin tuck 10x10 sec hold  Prone on elbows with chin retraction 20x5 sec hold  Brugger's postural " exercise with green band x 20 5 sec hold  Scapular retractions with red band 20x5 sec hold    Not performed:    -VRT: Gaze Stabilization  VOR x 1 horizontal, seated clean background, x 60 sec  (2)  VOR x 1 vertical, seated/standing, clean/busy background x 60 sec  VOR x2: angular accelerations of the head, stabilize gaze, and target moving in opposite directions.  Two-target VOR: gaze shift to target followed by an ipsilateral head movement  Imaginary Target: gaze held on target, close eyes, move head, open eyes to assess accuracy  Translation VOR: angular accelerations of the head, stabilize gaze on stationary target.      Patient Education and Home Exercises     Home Exercises Provided and Patient Education Provided     Education provided:   - Yes    Written Home Exercises Provided: Patient instructed to cont prior HEP. Exercises were reviewed and Brian was able to demonstrate them prior to the end of the session.  Brian demonstrated good  understanding of the education provided. See EMR under Patient Instructions for exercises provided during therapy sessions    ASSESSMENT     Patient with fair tolerance of exercises.  Pt reported sharp pain down the R elbow with shoulder extension with resistance band.  Pt also reported increased pain in the R levator scap and elbow area with Brugger's postural exercise.  When resistance was decreased along with a change in position, pt with less symptoms.  Pt attempted median and ulnar nerve gliding but had increased symptoms in the elbow, down the arm, and some into the fingers.  Pt reports this was more of a pulling/discomfort than sharp pain.  Pt with previous injury and graft in R lower arm but reports this was several years ago and he had not had any of these neural issues previously.  Pt reported no radiating symptoms with chin retractions.  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  Pt verbalized understanding.     Brian Is progressing well towards his goals.   Pt prognosis is Good.     Pt will continue to benefit from skilled outpatient physical therapy to address the deficits listed in the problem list box on initial evaluation, provide pt/family education and to maximize pt's level of independence in the home and community environment.     Pt's spiritual, cultural and educational needs considered and pt agreeable to plan of care and goals.     Anticipated barriers to physical therapy: none    Goals:   Short Term GOALS:  In 4 weeks, pt. will:  - improve cervical retraction by 25% for neutral seated work.  - report > 50% improvement in corrective posture episodes during work related tasks.  - demonstrate seated to standing transition of gaze stabilization up to 1' for vestibular purposes.  - decrease outcome measure limitation to <33%    PLAN     Continuation of therapy    Jeny Thomason, PTA

## 2023-04-11 ENCOUNTER — CLINICAL SUPPORT (OUTPATIENT)
Dept: AUDIOLOGY | Facility: CLINIC | Age: 38
End: 2023-04-11
Payer: COMMERCIAL

## 2023-04-11 DIAGNOSIS — R42 DIZZY: ICD-10-CM

## 2023-04-11 DIAGNOSIS — J30.81 ALLERGIC RHINITIS DUE TO ANIMAL HAIR AND DANDER: ICD-10-CM

## 2023-04-11 DIAGNOSIS — R42 DIZZINESS: ICD-10-CM

## 2023-04-11 DIAGNOSIS — G43.809 VESTIBULAR MIGRAINE: Primary | ICD-10-CM

## 2023-04-11 PROCEDURE — 92537 CALORIC VSTBLR TEST W/REC: CPT | Mod: S$GLB,,, | Performed by: AUDIOLOGIST

## 2023-04-11 PROCEDURE — 92537 PR CALORIC VSTBLR TEST W/REC BITHERMAL: ICD-10-PCS | Mod: S$GLB,,, | Performed by: AUDIOLOGIST

## 2023-04-11 PROCEDURE — 92540 PR VESTIBULAR EVAL NYSTAG FOVL&PERPH STIM OSCIL TRACKING: ICD-10-PCS | Mod: S$GLB,,, | Performed by: AUDIOLOGIST

## 2023-04-11 PROCEDURE — 92540 BASIC VESTIBULAR EVALUATION: CPT | Mod: S$GLB,,, | Performed by: AUDIOLOGIST

## 2023-04-11 NOTE — PROGRESS NOTES
Referring provider: Minerva Gaytan MD    Brian Gonzalez was seen 2023 for Videonystagmography (VNG) testing.      Pt reportedly abstained from vestibular suppressants for 48 hours prior to his VNG today.       VAT was negative bilaterally.      VNG Results (abnormal results italicized):   Oculomotor function tests:  Sinusoidal tracking - WNL  Saccade - abnormal velocity; normal latency and accuracy  OPKs - WNL  Spontaneous nystagmus was absent.  Gaze nystagmus was absent.   Dora-Hallpike Left was negative for BPPV.  Dora-Hallpike Right was negative for BPPV.  Static Positional nystagmus was absent except for clinically insignificant 2 d/s RB nystagmus with fixation for head right position.    Bi-thermal caloric irrigations revealed a 12% caloric weakness in the right ear, which is within normal limits, and 1% directional preponderance to the left, which is within normal limits.  Fixation suppression following caloric irrigations were normal.  RC: 16 d/s    RW: 24 d/s    d/s    LW: 30 d/s     Impressions:  Normal VNG.  Noted abnormal velocity for saccades oculomotor subtest in the presence of otherwise normal oculomotor performance.  Negative for BPPV bilaterally.  No significant caloric asymmetry or weakness.      Recommendations:  1. ENT review of results  2. Follow-up with Neurology    Tracings will be scanned into the patient's chart.

## 2023-04-11 NOTE — Clinical Note
Impressions:  Normal VNG.  Noted abnormal velocity for saccades oculomotor subtest in the presence of otherwise normal oculomotor performance.  Negative for BPPV bilaterally.  No significant caloric asymmetry or weakness.    Recommendations: 1. ENT review of results 2. Follow-up with Neurology

## 2023-04-12 ENCOUNTER — CLINICAL SUPPORT (OUTPATIENT)
Dept: REHABILITATION | Facility: HOSPITAL | Age: 38
End: 2023-04-12
Payer: COMMERCIAL

## 2023-04-12 DIAGNOSIS — M53.82 DECREASED ROM OF INTERVERTEBRAL DISCS OF CERVICAL SPINE: ICD-10-CM

## 2023-04-12 DIAGNOSIS — R42 DIZZY SPELLS: ICD-10-CM

## 2023-04-12 DIAGNOSIS — M54.2 NECK PAIN: Primary | ICD-10-CM

## 2023-04-12 PROCEDURE — 97110 THERAPEUTIC EXERCISES: CPT | Mod: PO,CQ

## 2023-04-12 PROCEDURE — 97140 MANUAL THERAPY 1/> REGIONS: CPT | Mod: PO,CQ

## 2023-04-12 PROCEDURE — 97112 NEUROMUSCULAR REEDUCATION: CPT | Mod: PO,CQ

## 2023-04-12 NOTE — PROGRESS NOTES
"[facility]   Physical Therapy Treatment Note     Name: Brian Gonzalez  Clinic Number: [id number]    Therapy Diagnosis:   Encounter Diagnoses   Name Primary?    Neck pain Yes    Decreased ROM of intervertebral discs of cervical spine     Dizzy spells      Physician: Tea Alberts NP    Visit Date: 4/12/2023  Physician Orders: PT Eval and Treat   Medical Diagnosis from Referral:   Migraine without aura and without status migrainosus, not intractable   Cervical myofascial pain syndrome  Bruxism   Evaluation Date: 3/22/2023  Authorization Period Expiration: 04/22/2023  Plan of Care Expiration: 05/19/2023  Visit # / Visits authorized: 3/20     Time In: 1345  Time Out: 1424  Total Billable Time: 43 minutes     Precautions: Standard    SUBJECTIVE     Pt reports: He has noticed when he was drawing a lot and using his mouse that his R hand hurt and felt weak.  Pt reported he noticed this mainly in the afternoon after he had been working awhile.  No current neck pain.  Pt is still having headaches intermittently but did not notice the headaches correlating with the hand pain/weakness.  Audiology doesn't feel that his feeling of being offbalanced is due to VNR.  Audiology is referring him back to Neurology.    Said vestibular migraine may     He was compliant with home exercise program.  Response to previous treatment: no adverse affects  Functional change: too soon to tell    Pain: 0/10  Location: bilateral neck     OBJECTIVE     Objective Measures updated at progress report unless specified.        Modified VAS (Vertebral Artery Screen), in sitting (rotation, then extension 10"):  R: Negative  L: Negative     VESTIBULAR EXAMINATION     Oculomotor Screen in room light (fixation present):   Known eye dysfunction: None   Ocular ROM: WNL    Tracking/Smooth Pursuits: Intact  Saccades: Impaired: left side, 2 beats  Convergence: WNL    Spontaneous Nystagmus: Absent   Gaze Holding Nystagmus: " "Absent   Head Impulse Test: Positive left side     POSITIONAL CANAL TESTING  Looking for nystagmus (slow phase followed by quick phase to the affected side for BPPV)       Treatment     Orry received the treatments listed below:      therapeutic exercises to develop strength, endurance, ROM, flexibility, posture, and core stabilization for 25 minutes including:  - UBE 3' fwd/bwd each- not performed  - cervical retraction 3/20", f/b 2/10 2" hold  - T-spine rotation x 10 each with cervical rotation  - T-spine extension with clinician over-overpressure x 10  - prone lying on elbows x 3', progressed to extension in lying  x 10  - Shoulder extension with red band 2x10- cueing to avoid full extension      manual therapy techniques: Manual traction, Myofacial release, and Soft tissue Mobilization were applied to the: neck for 15 minutes, including:  STM to B UT  Sub occipital release  Gentle cervical traction      neuromuscular re-education activities to improve: Balance, Kinesthetic, Sense, Posture, and vestibular for 20 minutes. The following activities were included:    Supine chin tuck 10x10 sec hold  Supine cervical retraction 20x5 sec hold  Prone on elbows with chin retraction 20x5 sec hold- not performed  Brugger's postural exercise with red band x 20 5 sec hold  Scapular retractions with red band 20x5 sec hold  Chin tucks with ball on the wall x 20  Chin retractions into ball on wall x 10    Not performed:    -VRT: Gaze Stabilization  VOR x 1 horizontal, seated clean background, x 60 sec  (2)  VOR x 1 vertical, seated/standing, clean/busy background x 60 sec  VOR x2: angular accelerations of the head, stabilize gaze, and target moving in opposite directions.  Two-target VOR: gaze shift to target followed by an ipsilateral head movement  Imaginary Target: gaze held on target, close eyes, move head, open eyes to assess accuracy  Translation VOR: angular accelerations of the head, stabilize gaze on stationary " target.      Patient Education and Home Exercises     Home Exercises Provided and Patient Education Provided     Education provided:   - Yes    Written Home Exercises Provided: Patient instructed to cont prior HEP. Exercises were reviewed and Brian was able to demonstrate them prior to the end of the session.  Brian demonstrated good  understanding of the education provided. See EMR under Patient Instructions for exercises provided during therapy sessions    ASSESSMENT     Patient reported mild traveling discomfort down the arm initially with chin tucks at the wall but this resolved after a few repetitions.  Resistance was decreased with exercises due to increased arm symptoms last visit.  Pt with hypermobility to elbow joint which may be causing some neural compression with elbow extension.  Advised patient to avoid locked elbow extension when performing exercises to limit ulnar symptoms.  Pt verbalized understanding.  Attempted nerve glides in supine with patient reported no real/much stretching except with mild stretching with median nerve glide.    Brian Is progressing well towards his goals.   Pt prognosis is Good.     Pt will continue to benefit from skilled outpatient physical therapy to address the deficits listed in the problem list box on initial evaluation, provide pt/family education and to maximize pt's level of independence in the home and community environment.     Pt's spiritual, cultural and educational needs considered and pt agreeable to plan of care and goals.     Anticipated barriers to physical therapy: none    Goals:   Short Term GOALS:  In 4 weeks, pt. will:  - improve cervical retraction by 25% for neutral seated work.  - report > 50% improvement in corrective posture episodes during work related tasks.  - demonstrate seated to standing transition of gaze stabilization up to 1' for vestibular purposes.  - decrease outcome measure limitation to <33%    PLAN     Continuation of therapy    Jeny  Paddy, PTA

## 2023-04-19 ENCOUNTER — CLINICAL SUPPORT (OUTPATIENT)
Dept: REHABILITATION | Facility: HOSPITAL | Age: 38
End: 2023-04-19
Payer: COMMERCIAL

## 2023-04-19 DIAGNOSIS — M54.2 NECK PAIN: Primary | ICD-10-CM

## 2023-04-19 DIAGNOSIS — R42 DIZZY SPELLS: ICD-10-CM

## 2023-04-19 DIAGNOSIS — M53.82 DECREASED ROM OF INTERVERTEBRAL DISCS OF CERVICAL SPINE: ICD-10-CM

## 2023-04-19 PROCEDURE — 97140 MANUAL THERAPY 1/> REGIONS: CPT | Mod: PO,CQ

## 2023-04-19 PROCEDURE — 97112 NEUROMUSCULAR REEDUCATION: CPT | Mod: PO,CQ

## 2023-04-19 NOTE — PROGRESS NOTES
"[facility]   Physical Therapy Treatment Note     Name: Brian Gonzalez  Clinic Number: [id number]    Therapy Diagnosis:   Encounter Diagnoses   Name Primary?    Neck pain Yes    Decreased ROM of intervertebral discs of cervical spine     Dizzy spells      Physician: Tea Alberts NP    Visit Date: 4/19/2023  Physician Orders: PT Eval and Treat   Medical Diagnosis from Referral:   Migraine without aura and without status migrainosus, not intractable   Cervical myofascial pain syndrome  Bruxism   Evaluation Date: 3/22/2023  Authorization Period Expiration: 04/22/2023  Plan of Care Expiration: 05/19/2023  Visit # / Visits authorized: 4/20     Time In: 1345  Time Out: 1430  Total Billable Time: 45 minutes     Precautions: Standard    SUBJECTIVE     Pt reports: He had to look down Monday for about 13 hrs.  Pt reports he started to feel bad Monday evening.  Pt reports on Tuesday, he was only dizzy if he abruptly looked up.  Pt was nauseous Tuesday, but he had to because he had to work.  Pt reports he worked Tuesday for 12 hours but he was trying to find ways to not look down.  He would position his tablet higher such as on the table or using his monitors more forcing him to look up.  Pt reports he didn't take anything for the nausea.  Pt reports Thursday and Fridays are usually calmer than the rest of his week.  He was compliant with home exercise program.  Response to previous treatment: a little sore but felt better  Functional change: too soon to tell    Pain: 3/10  Location: bilateral neck     OBJECTIVE     Objective Measures updated at progress report unless specified.        Modified VAS (Vertebral Artery Screen), in sitting (rotation, then extension 10"):  R: Negative  L: Negative     VESTIBULAR EXAMINATION     Oculomotor Screen in room light (fixation present):   Known eye dysfunction: None   Ocular ROM: WNL    Tracking/Smooth Pursuits: Intact  Saccades: Impaired: left side, 2 " "beats  Convergence: WNL    Spontaneous Nystagmus: Absent   Gaze Holding Nystagmus: Absent   Head Impulse Test: Positive left side     POSITIONAL CANAL TESTING  Looking for nystagmus (slow phase followed by quick phase to the affected side for BPPV)       Treatment     Orry received the treatments listed below:      therapeutic exercises to develop strength, endurance, ROM, flexibility, posture, and core stabilization for 0 minutes including:  - UBE 3' fwd/bwd each- not performed  - T-spine rotation x 10 each with cervical rotation  - T-spine extension with clinician over-overpressure x 10      manual therapy techniques: Manual traction, Myofacial release, and Soft tissue Mobilization were applied to the: neck for 10 minutes, including:  Sub occipital release  Gentle cervical traction      neuromuscular re-education activities to improve: Balance, Kinesthetic, Sense, Posture, and vestibular for 35 minutes. The following activities were included:    Seated cervical retraction 3/20", f/b 2/10 2" hold- to improve neutral head and sitting posture  Supine chin tuck x 3 min 5 sec hold  Supine cervical retraction 20x5 sec hold  Prone on elbows with chin retraction 20x5 sec hold  Prone press up x 10- to improve posture  Brugger's postural exercise with red band x 20 5 sec hold  Scapular retractions with red band 20x5 sec hold  Chin tucks with ball on the wall x 20  Chin retractions into ball on wall x 20  Shoulder extension with red band 2x10- cueing to avoid full extension and scap retraction    Not performed:    -VRT: Gaze Stabilization  VOR x 1 horizontal, seated clean background, x 60 sec  (2)  VOR x 1 vertical, seated/standing, clean/busy background x 60 sec  VOR x2: angular accelerations of the head, stabilize gaze, and target moving in opposite directions.  Two-target VOR: gaze shift to target followed by an ipsilateral head movement  Imaginary Target: gaze held on target, close eyes, move head, open eyes to assess " accuracy  Translation VOR: angular accelerations of the head, stabilize gaze on stationary target.      Patient Education and Home Exercises     Home Exercises Provided and Patient Education Provided     Education provided:   - Yes    Written Home Exercises Provided: Patient instructed to cont prior HEP. Exercises were reviewed and Brian was able to demonstrate them prior to the end of the session.  Brian demonstrated good  understanding of the education provided. See EMR under Patient Instructions for exercises provided during therapy sessions    ASSESSMENT     Patient reported increased muscle soreness in the neck following treatment but overall improvement with symptoms.  Discussed trying to perform chin retractions intermittent throughout the day especially while patient is working because of his increased time in neck flexion.  Pt reported he would try and implement this during his day.  Pt's increased time in cervical flexion continues to aggravate symptoms.  Pt is displaying improved tolerance for retraction movements.    Brian Is progressing well towards his goals.   Pt prognosis is Good.     Pt will continue to benefit from skilled outpatient physical therapy to address the deficits listed in the problem list box on initial evaluation, provide pt/family education and to maximize pt's level of independence in the home and community environment.     Pt's spiritual, cultural and educational needs considered and pt agreeable to plan of care and goals.     Anticipated barriers to physical therapy: none    Goals:   Short Term GOALS:  In 4 weeks, pt. will:  - improve cervical retraction by 25% for neutral seated work.  - report > 50% improvement in corrective posture episodes during work related tasks.  - demonstrate seated to standing transition of gaze stabilization up to 1' for vestibular purposes.  - decrease outcome measure limitation to <33%    PLAN     Continuation of therapy    Jeny Thomason, PTA

## 2023-04-26 ENCOUNTER — CLINICAL SUPPORT (OUTPATIENT)
Dept: REHABILITATION | Facility: HOSPITAL | Age: 38
End: 2023-04-26
Payer: COMMERCIAL

## 2023-04-26 DIAGNOSIS — M53.82 DECREASED ROM OF INTERVERTEBRAL DISCS OF CERVICAL SPINE: ICD-10-CM

## 2023-04-26 DIAGNOSIS — R42 DIZZY SPELLS: ICD-10-CM

## 2023-04-26 DIAGNOSIS — M54.2 NECK PAIN: Primary | ICD-10-CM

## 2023-04-26 PROCEDURE — 97112 NEUROMUSCULAR REEDUCATION: CPT | Mod: PO | Performed by: PHYSICAL THERAPIST

## 2023-04-26 PROCEDURE — 97110 THERAPEUTIC EXERCISES: CPT | Mod: PO | Performed by: PHYSICAL THERAPIST

## 2023-04-26 PROCEDURE — 97140 MANUAL THERAPY 1/> REGIONS: CPT | Mod: PO | Performed by: PHYSICAL THERAPIST

## 2023-04-26 NOTE — PROGRESS NOTES
"[facility]   Physical Therapy Treatment Note     Name: Brian Gonzalez  Clinic Number: [id number]    Therapy Diagnosis:   Encounter Diagnoses   Name Primary?    Neck pain Yes    Decreased ROM of intervertebral discs of cervical spine     Dizzy spells      Physician: Tea Alberts NP    Visit Date: 4/26/2023  Physician Orders: PT Eval and Treat   Medical Diagnosis from Referral:   Migraine without aura and without status migrainosus, not intractable   Cervical myofascial pain syndrome  Bruxism   Evaluation Date: 3/22/2023  Authorization Period Expiration: 04/22/2023  Plan of Care Expiration: 05/19/2023  Visit # / Visits authorized: 5/20     Time In: 1330  Time Out: 1430  Total Billable Time: 40 minutes     Precautions: Standard    SUBJECTIVE     Pt reports: feeling better since starting therapy and only gets dizzy once looking downward and turning head like under his sofa. No numbness/tingling noted. No radicular pain. Mid to low back soreness after mowing the grass this past weekend.  Stiffness reported.  He was compliant with home exercise program.  Response to previous treatment: a little sore but felt better  Functional change: too soon to tell    Pain: 3/10  Location: bilateral neck     OBJECTIVE     Objective Measures updated at progress report unless specified.        Modified VAS (Vertebral Artery Screen), in sitting (rotation, then extension 10"):  R: Negative  L: Negative     VESTIBULAR EXAMINATION     Oculomotor Screen in room light (fixation present):   Known eye dysfunction: None   Ocular ROM: WNL    Tracking/Smooth Pursuits: Intact  Saccades: Impaired: left side, 2 beats  Convergence: WNL    Spontaneous Nystagmus: Absent   Gaze Holding Nystagmus: Absent   Head Impulse Test: Positive left side     POSITIONAL CANAL TESTING  Looking for nystagmus (slow phase followed by quick phase to the affected side for BPPV)       Treatment     Brian received the treatments listed " "below:      therapeutic exercises to develop strength, endurance, ROM, flexibility, posture, and core stabilization for 25 minutes including:  - UBE 3' fwd/bwd each- not performed  - T-spine rotation x 10 each with cervical rotation  - T-spine extension with clinician over-overpressure x 10  - supine TA x 20, bridge x 20  - S/L clam shells with green band x 20 each      manual therapy techniques: Manual traction, Myofacial release, and Soft tissue Mobilization were applied to the: neck for 8 minutes, including:  Sub occipital release  Gentle cervical traction      neuromuscular re-education activities to improve: Balance, Kinesthetic, Sense, Posture, and vestibular for 20 minutes. The following activities were included:    Seated cervical retraction 3/20", f/b 2/10 2" hold- to improve neutral head and sitting posture  Supine chin tuck x 3 min 5 sec hold  Supine cervical retraction 20x5 sec hold  Prone on elbows with chin retraction 20x5 sec hold  Prone press up x 10- to improve posture  Brugger's postural exercise with red band x 20 5 sec hold  Scapular retractions with red band 20x5 sec hold    NP  -VRT: Gaze Stabilization  VOR x 1 horizontal, seated clean background, x 60 sec  (2)  VOR x 1 vertical, seated/standing, clean/busy background x 60 sec  VOR x2: angular accelerations of the head, stabilize gaze, and target moving in opposite directions.  Two-target VOR: gaze shift to target followed by an ipsilateral head movement  Imaginary Target: gaze held on target, close eyes, move head, open eyes to assess accuracy  Translation VOR: angular accelerations of the head, stabilize gaze on stationary target.      Patient Education and Home Exercises     Home Exercises Provided and Patient Education Provided     Education provided:   - Yes    Written Home Exercises Provided: Patient instructed to cont prior HEP. Exercises were reviewed and Brian was able to demonstrate them prior to the end of the session.  Brian " demonstrated good  understanding of the education provided. See EMR under Patient Instructions for exercises provided during therapy sessions    ASSESSMENT     Patient demonstrated improvement in cervical retraction in seated position with no reproduction of dizziness during VOR testing at the end of session. No adverse effects.    Brian Is progressing well towards his goals.   Pt prognosis is Good.     Pt will continue to benefit from skilled outpatient physical therapy to address the deficits listed in the problem list box on initial evaluation, provide pt/family education and to maximize pt's level of independence in the home and community environment.     Pt's spiritual, cultural and educational needs considered and pt agreeable to plan of care and goals.     Anticipated barriers to physical therapy: none    Goals:   Short Term GOALS:  In 4 weeks, pt. will:  - improve cervical retraction by 25% for neutral seated work.  - report > 50% improvement in corrective posture episodes during work related tasks.  - demonstrate seated to standing transition of gaze stabilization up to 1' for vestibular purposes.  - decrease outcome measure limitation to <33%    PLAN     Continuation of therapy    Frandy Joshi, PT

## 2023-05-03 ENCOUNTER — CLINICAL SUPPORT (OUTPATIENT)
Dept: REHABILITATION | Facility: HOSPITAL | Age: 38
End: 2023-05-03
Payer: COMMERCIAL

## 2023-05-03 DIAGNOSIS — M53.82 DECREASED ROM OF INTERVERTEBRAL DISCS OF CERVICAL SPINE: ICD-10-CM

## 2023-05-03 DIAGNOSIS — R42 DIZZY SPELLS: ICD-10-CM

## 2023-05-03 DIAGNOSIS — M54.2 NECK PAIN: Primary | ICD-10-CM

## 2023-05-03 PROCEDURE — 97140 MANUAL THERAPY 1/> REGIONS: CPT | Mod: PO | Performed by: PHYSICAL THERAPIST

## 2023-05-03 PROCEDURE — 97110 THERAPEUTIC EXERCISES: CPT | Mod: PO | Performed by: PHYSICAL THERAPIST

## 2023-05-03 PROCEDURE — 97112 NEUROMUSCULAR REEDUCATION: CPT | Mod: PO | Performed by: PHYSICAL THERAPIST

## 2023-05-03 NOTE — PROGRESS NOTES
"[facility]   Physical Therapy Treatment Note     Name: Brian Gonzalez  Clinic Number: [id number]    Therapy Diagnosis:   Encounter Diagnoses   Name Primary?    Neck pain Yes    Decreased ROM of intervertebral discs of cervical spine     Dizzy spells      Physician: Tea Alberts NP    Visit Date: 5/3/2023  Physician Orders: PT Eval and Treat   Medical Diagnosis from Referral:   Migraine without aura and without status migrainosus, not intractable   Cervical myofascial pain syndrome  Bruxism   Evaluation Date: 3/22/2023  Authorization Period Expiration: 04/22/2023  Plan of Care Expiration: 05/19/2023  Visit # / Visits authorized: 6/20     Time In: 1330  Time Out: 1430  Total Billable Time: 53 minutes     Precautions: Standard    SUBJECTIVE     Pt reports: improvement with neck pain and will f/u with MD in the near future. Less HA reported with low back stiffness. No radicular pain.  He was compliant with home exercise program.  Response to previous treatment: a little sore but felt better  Functional change: too soon to tell    Pain: 2-3/10  Location: bilateral neck     OBJECTIVE     Objective Measures updated at progress report unless specified.        Modified VAS (Vertebral Artery Screen), in sitting (rotation, then extension 10"):  R: Negative  L: Negative     VESTIBULAR EXAMINATION     Oculomotor Screen in room light (fixation present):   Known eye dysfunction: None   Ocular ROM: WNL    Tracking/Smooth Pursuits: Intact  Saccades: Impaired: left side, 2 beats  Convergence: WNL    Spontaneous Nystagmus: Absent   Gaze Holding Nystagmus: Absent   Head Impulse Test: Positive left side     POSITIONAL CANAL TESTING  Looking for nystagmus (slow phase followed by quick phase to the affected side for BPPV)       Treatment     Brian received the treatments listed below:      therapeutic exercises to develop strength, endurance, ROM, flexibility, posture, and core stabilization for 25 " "minutes including:  -  UBE 3' fwd/bwd each- not performed  -  seated: T-spine rotation x 10 each with cervical rotation  -  T-spine extension with clinician over-overpressure x 10  - supine TA x 20, bridge x 20  - S/L clam shells with green band x 20 each (NP)  - S/L: open book x 20 each    manual therapy techniques: Manual traction, Myofacial release, and Soft tissue Mobilization were applied to the: neck for 8 minutes, including:  Sub occipital release  Gentle cervical traction/retraction with belt, intermittently  Cervical side-glide    neuromuscular re-education activities to improve: Balance, Kinesthetic, Sense, Posture, and vestibular for 20 minutes. The following activities were included:    Seated cervical retraction 3/20", f/b 2/10 2" hold- to improve neutral head and sitting posture  Supine chin tuck x 3 min 5 sec hold  Supine cervical retraction 20x5 sec hold  Prone on elbows with chin retraction 20x5 sec hold  Prone press up x 10- to improve posture  Brugger's postural exercise with red band x 20 5 sec hold  Scapular retractions with red band 20x5 sec hold    NP  -VRT: Gaze Stabilization  VOR x 1 horizontal, seated clean background, x 60 sec  (2)  VOR x 1 vertical, seated/standing, clean/busy background x 60 sec  VOR x2: angular accelerations of the head, stabilize gaze, and target moving in opposite directions.  Two-target VOR: gaze shift to target followed by an ipsilateral head movement  Imaginary Target: gaze held on target, close eyes, move head, open eyes to assess accuracy  Translation VOR: angular accelerations of the head, stabilize gaze on stationary target.      Patient Education and Home Exercises     Home Exercises Provided and Patient Education Provided     Education provided:   - Yes    Written Home Exercises Provided: Patient instructed to cont prior HEP. Exercises were reviewed and Brian was able to demonstrate them prior to the end of the session.  Brian demonstrated good  understanding " of the education provided. See EMR under Patient Instructions for exercises provided during therapy sessions    ASSESSMENT     Patient demonstrated improvement in thoracic rotation overall with less left thoracic rotation noted compared to right side. No adverse effects.    Brian Is progressing well towards his goals.   Pt prognosis is Good.     Pt will continue to benefit from skilled outpatient physical therapy to address the deficits listed in the problem list box on initial evaluation, provide pt/family education and to maximize pt's level of independence in the home and community environment.     Pt's spiritual, cultural and educational needs considered and pt agreeable to plan of care and goals.     Anticipated barriers to physical therapy: none    Goals:   Short Term GOALS:  In 4 weeks, pt. will:  - improve cervical retraction by 25% for neutral seated work.  - report > 50% improvement in corrective posture episodes during work related tasks.  - demonstrate seated to standing transition of gaze stabilization up to 1' for vestibular purposes.  - decrease outcome measure limitation to <33%    PLAN     Continuation of therapy    Frandy Joshi, PT

## 2023-05-08 PROBLEM — J01.90 ACUTE RHINOSINUSITIS: Status: RESOLVED | Noted: 2023-02-06 | Resolved: 2023-05-08

## 2023-05-09 ENCOUNTER — OFFICE VISIT (OUTPATIENT)
Dept: NEUROLOGY | Facility: CLINIC | Age: 38
End: 2023-05-09
Payer: COMMERCIAL

## 2023-05-09 VITALS
DIASTOLIC BLOOD PRESSURE: 87 MMHG | HEIGHT: 72 IN | HEART RATE: 75 BPM | SYSTOLIC BLOOD PRESSURE: 133 MMHG | RESPIRATION RATE: 17 BRPM | WEIGHT: 232.56 LBS | BODY MASS INDEX: 31.5 KG/M2

## 2023-05-09 DIAGNOSIS — G43.009 MIGRAINE WITHOUT AURA AND WITHOUT STATUS MIGRAINOSUS, NOT INTRACTABLE: Primary | ICD-10-CM

## 2023-05-09 DIAGNOSIS — R42 DIZZINESS: ICD-10-CM

## 2023-05-09 DIAGNOSIS — F45.8 BRUXISM: ICD-10-CM

## 2023-05-09 PROCEDURE — 99214 PR OFFICE/OUTPT VISIT, EST, LEVL IV, 30-39 MIN: ICD-10-PCS | Mod: S$GLB,,, | Performed by: NURSE PRACTITIONER

## 2023-05-09 PROCEDURE — 1160F RVW MEDS BY RX/DR IN RCRD: CPT | Mod: CPTII,S$GLB,, | Performed by: NURSE PRACTITIONER

## 2023-05-09 PROCEDURE — 3008F PR BODY MASS INDEX (BMI) DOCUMENTED: ICD-10-PCS | Mod: CPTII,S$GLB,, | Performed by: NURSE PRACTITIONER

## 2023-05-09 PROCEDURE — 3008F BODY MASS INDEX DOCD: CPT | Mod: CPTII,S$GLB,, | Performed by: NURSE PRACTITIONER

## 2023-05-09 PROCEDURE — 99999 PR PBB SHADOW E&M-EST. PATIENT-LVL IV: ICD-10-PCS | Mod: PBBFAC,,, | Performed by: NURSE PRACTITIONER

## 2023-05-09 PROCEDURE — 1159F PR MEDICATION LIST DOCUMENTED IN MEDICAL RECORD: ICD-10-PCS | Mod: CPTII,S$GLB,, | Performed by: NURSE PRACTITIONER

## 2023-05-09 PROCEDURE — 3079F DIAST BP 80-89 MM HG: CPT | Mod: CPTII,S$GLB,, | Performed by: NURSE PRACTITIONER

## 2023-05-09 PROCEDURE — 3075F SYST BP GE 130 - 139MM HG: CPT | Mod: CPTII,S$GLB,, | Performed by: NURSE PRACTITIONER

## 2023-05-09 PROCEDURE — 99214 OFFICE O/P EST MOD 30 MIN: CPT | Mod: S$GLB,,, | Performed by: NURSE PRACTITIONER

## 2023-05-09 PROCEDURE — 3075F PR MOST RECENT SYSTOLIC BLOOD PRESS GE 130-139MM HG: ICD-10-PCS | Mod: CPTII,S$GLB,, | Performed by: NURSE PRACTITIONER

## 2023-05-09 PROCEDURE — 99999 PR PBB SHADOW E&M-EST. PATIENT-LVL IV: CPT | Mod: PBBFAC,,, | Performed by: NURSE PRACTITIONER

## 2023-05-09 PROCEDURE — 1159F MED LIST DOCD IN RCRD: CPT | Mod: CPTII,S$GLB,, | Performed by: NURSE PRACTITIONER

## 2023-05-09 PROCEDURE — 3079F PR MOST RECENT DIASTOLIC BLOOD PRESSURE 80-89 MM HG: ICD-10-PCS | Mod: CPTII,S$GLB,, | Performed by: NURSE PRACTITIONER

## 2023-05-09 PROCEDURE — 1160F PR REVIEW ALL MEDS BY PRESCRIBER/CLIN PHARMACIST DOCUMENTED: ICD-10-PCS | Mod: CPTII,S$GLB,, | Performed by: NURSE PRACTITIONER

## 2023-05-09 NOTE — PATIENT INSTRUCTIONS
Please call our clinic at [phone] or send a message on the [facility] portal if there are any changes to the plan described below, for example,if you are not contacted for the requested tests, referral(s) within one week, if you are unable to receive the medications prescribed, or if you feel you need to change the treatment course for any reason.     TESTING:  -- none at this time    REFERRALS:  -- none     PREVENTION (use daily regardless of headache):  -- hold off on starting zonisamide at bedtime  -- start magnesium in ONE of the following preparations -               1. Magnesium oxide 800mg daily (the most common over the counter kind, may causes loose stools)              2. Magnesium citrate 400-500mg daily (harder to find, but more neutral on the bowels)              3. Magnesium glycinate 400mg daily (hardest to find, look online, but most bowel-neutral, best absorbed)     AS-NEEDED TREATMENT (use total no more than 10 days per month unless otherwise stated):  -- START rizatriptan with next migraine attack (escalation of headache). You can repeat two hours later if needed  -- continue tizanidine at night. This is a muscle relaxer and it will also make you potentially sleepy. Start with half a tablet to see how you respond but can take up to a whole tablet if needed
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
## 2023-05-09 NOTE — ASSESSMENT & PLAN NOTE
Did not start zonisamide. Has had improvement with PT so continue PT and we will reevaluate once complete.

## 2023-05-09 NOTE — ASSESSMENT & PLAN NOTE
Rare spells now that seem to only be associated with bending down and coming back up. Not consistent with vestibular migraine. continue PT as this is helping

## 2023-05-10 ENCOUNTER — CLINICAL SUPPORT (OUTPATIENT)
Dept: REHABILITATION | Facility: HOSPITAL | Age: 38
End: 2023-05-10
Payer: COMMERCIAL

## 2023-05-10 DIAGNOSIS — R42 DIZZY SPELLS: ICD-10-CM

## 2023-05-10 DIAGNOSIS — M53.82 DECREASED ROM OF INTERVERTEBRAL DISCS OF CERVICAL SPINE: ICD-10-CM

## 2023-05-10 DIAGNOSIS — M54.2 NECK PAIN: Primary | ICD-10-CM

## 2023-05-10 PROCEDURE — 97140 MANUAL THERAPY 1/> REGIONS: CPT | Mod: PO | Performed by: PHYSICAL THERAPIST

## 2023-05-10 PROCEDURE — 97110 THERAPEUTIC EXERCISES: CPT | Mod: PO | Performed by: PHYSICAL THERAPIST

## 2023-05-10 NOTE — PROGRESS NOTES
"[facility]   Physical Therapy Treatment Note     Name: Brian Gonzalez  Clinic Number: [id number]    Therapy Diagnosis:   Encounter Diagnoses   Name Primary?    Neck pain Yes    Decreased ROM of intervertebral discs of cervical spine     Dizzy spells      Physician: Tea Alberts NP    Visit Date: 5/10/2023  Physician Orders: PT Eval and Treat   Medical Diagnosis from Referral:   Migraine without aura and without status migrainosus, not intractable   Cervical myofascial pain syndrome  Bruxism   Evaluation Date: 3/22/2023  Authorization Period Expiration: 04/22/2023  Plan of Care Expiration: 05/19/2023  Visit # / Visits authorized: 7/20     Time In: 1430  Time Out: 1530  Total Billable Time: 30 minutes     Precautions: Standard    SUBJECTIVE     Pt reports: having a migraine yesterday and took some medication that was prescribed that he thinks may have increased s/s. Patient reported having a rough night with less sleep as well and continues to have HA noted. No dizziness noted.  He was compliant with home exercise program.  Response to previous treatment: a little sore but felt better  Functional change: too soon to tell    Pain: 2-3/10  Location: bilateral neck     OBJECTIVE     Objective Measures updated at progress report unless specified.        Modified VAS (Vertebral Artery Screen), in sitting (rotation, then extension 10"):  R: Negative  L: Negative     VESTIBULAR EXAMINATION     Oculomotor Screen in room light (fixation present):   Known eye dysfunction: None   Ocular ROM: WNL    Tracking/Smooth Pursuits: Intact  Saccades: Impaired: left side, 2 beats  Convergence: WNL    Spontaneous Nystagmus: Absent   Gaze Holding Nystagmus: Absent   Head Impulse Test: Positive left side     POSITIONAL CANAL TESTING  Looking for nystagmus (slow phase followed by quick phase to the affected side for BPPV)       Treatment     Brian received the treatments listed below:      therapeutic " "exercises to develop strength, endurance, ROM, flexibility, posture, and core stabilization for 25 minutes including: (20 minutes one on one)  -  UBE 3' fwd/bwd each-    -  seated: T-spine rotation x 10 each with cervical rotation  -  T-spine extension with clinician over-overpressure x 10    Seated cervical retraction 3/20", f/b 2/10 2" hold- to improve neutral head and sitting posture  Supine chin tuck x 3 min 5 sec hold  Supine cervical retraction 20x5 sec hold  Prone on elbows with chin retraction 20x5 sec hold  Prone press up x 10- to improve posture  Brugger's postural exercise with red band x 20 5 sec hold  Scapular retractions with red band 20x5 sec hold    manual therapy techniques: Manual traction, Myofacial release, and Soft tissue Mobilization were applied to the: neck for 8 minutes, including:  Sub occipital release  Gentle cervical traction/retraction with belt, intermittently  Cervical side-glide  Prone: grade I,II to upper cervical spine     neuromuscular re-education activities to improve: Balance, Kinesthetic, Sense, Posture, and vestibular for 00 minutes. The following activities were included:    NP  -VRT: Gaze Stabilization  VOR x 1 horizontal, seated clean background, x 60 sec  (2)  VOR x 1 vertical, seated/standing, clean/busy background x 60 sec  VOR x2: angular accelerations of the head, stabilize gaze, and target moving in opposite directions.  Two-target VOR: gaze shift to target followed by an ipsilateral head movement  Imaginary Target: gaze held on target, close eyes, move head, open eyes to assess accuracy  Translation VOR: angular accelerations of the head, stabilize gaze on stationary target.      Patient Education and Home Exercises     Home Exercises Provided and Patient Education Provided     Education provided:   - Yes    Written Home Exercises Provided: Patient instructed to cont prior HEP. Exercises were reviewed and Brian was able to demonstrate them prior to the end of the " session.  Brian demonstrated good  understanding of the education provided. See EMR under Patient Instructions for exercises provided during therapy sessions    ASSESSMENT     Patient demonstrated improvement with seated postural tasks with no cueing. Patient demonstrated good tolerance with manual therapy, less pain, and less s/s of HA. No adverse effects.    Brian Is progressing well towards his goals.   Pt prognosis is Good.     Pt will continue to benefit from skilled outpatient physical therapy to address the deficits listed in the problem list box on initial evaluation, provide pt/family education and to maximize pt's level of independence in the home and community environment.     Pt's spiritual, cultural and educational needs considered and pt agreeable to plan of care and goals.     Anticipated barriers to physical therapy: none    Goals:   Short Term GOALS:  In 4 weeks, pt. will:  - improve cervical retraction by 25% for neutral seated work.  - report > 50% improvement in corrective posture episodes during work related tasks.  - demonstrate seated to standing transition of gaze stabilization up to 1' for vestibular purposes.  - decrease outcome measure limitation to <33%    PLAN     Continuation of therapy    Frandy Joshi, PT

## 2023-05-17 ENCOUNTER — CLINICAL SUPPORT (OUTPATIENT)
Dept: REHABILITATION | Facility: HOSPITAL | Age: 38
End: 2023-05-17
Payer: COMMERCIAL

## 2023-05-17 ENCOUNTER — TELEPHONE (OUTPATIENT)
Dept: NEUROLOGY | Facility: CLINIC | Age: 38
End: 2023-05-17
Payer: COMMERCIAL

## 2023-05-17 DIAGNOSIS — M54.2 NECK PAIN: Primary | ICD-10-CM

## 2023-05-17 DIAGNOSIS — M53.82 DECREASED ROM OF INTERVERTEBRAL DISCS OF CERVICAL SPINE: ICD-10-CM

## 2023-05-17 DIAGNOSIS — R42 DIZZY SPELLS: ICD-10-CM

## 2023-05-17 PROCEDURE — 97110 THERAPEUTIC EXERCISES: CPT | Mod: PO,CQ

## 2023-05-17 PROCEDURE — 97140 MANUAL THERAPY 1/> REGIONS: CPT | Mod: PO,CQ

## 2023-05-17 NOTE — TELEPHONE ENCOUNTER
Called and left voice mail to call clinic to reschedule 3 month follow up with Tea Alberts NP. Appointment cancelled and message sent to contact to reschedule.

## 2023-05-17 NOTE — PROGRESS NOTES
" [facility]   Physical Therapy Treatment Note     Name: Brian Castillo [place]  Clinic Number: [id number]    Therapy Diagnosis:   Encounter Diagnoses   Name Primary?    Neck pain Yes    Decreased ROM of intervertebral discs of cervical spine     Dizzy spells        Physician: Tea Alberts NP    Visit Date: 5/17/2023  Physician Orders: PT Eval and Treat   Medical Diagnosis from Referral:   Migraine without aura and without status migrainosus, not intractable   Cervical myofascial pain syndrome  Bruxism   Evaluation Date: 3/22/2023  Authorization Period Expiration: 04/22/2023  Plan of Care Expiration: 05/19/2023  Visit # / Visits authorized: 8/20     Time In: 1:45  Time Out: 2:33  Total Billable Time: 40 minutes     Precautions: Standard    SUBJECTIVE     Pt reports:having a migraine last visit, but when the PT stretch his neck it helped.  He was compliant with home exercise program.  Response to previous treatment: a little sore but felt better  Functional change: too soon to tell    Pain: 3/10  Location: bilateral neck     OBJECTIVE     Objective Measures updated at progress report unless specified.        Modified VAS (Vertebral Artery Screen), in sitting (rotation, then extension 10"):  R: Negative  L: Negative     VESTIBULAR EXAMINATION     Oculomotor Screen in room light (fixation present):   Known eye dysfunction: None   Ocular ROM: WNL    Tracking/Smooth Pursuits: Intact  Saccades: Impaired: left side, 2 beats  Convergence: WNL    Spontaneous Nystagmus: Absent   Gaze Holding Nystagmus: Absent   Head Impulse Test: Positive left side     POSITIONAL CANAL TESTING  Looking for nystagmus (slow phase followed by quick phase to the affected side for BPPV)       Treatment     Brian received the treatments listed below:      therapeutic exercises to develop strength, endurance, ROM, flexibility, posture, and core stabilization for 32 minutes including: (20 minutes one on one)  -  UBE 3' " "fwd/bwd each-    -  seated: T-spine rotation x 10 each with cervical rotation  -  T-spine extension with clinician over-overpressure x 10-(self stretch over chair today 5/17/23)    Seated cervical retraction 3/20", f/b 2/10 2" hold- to improve neutral head and sitting posture  Seated chin tuck x 3 min 5 sec hold  Supine cervical retraction 20x5 sec hold  Prone on elbows with chin retraction 20x5 sec hold  Prone press up x 10- to improve posture  Brugger's postural exercise with red band x 20 5 sec hold  Scapular retractions with red band 20x5 sec hold  Standing scapular Retractions 10x for work    manual therapy techniques: Manual traction, Myofacial release, and Soft tissue Mobilization were applied to the: neck for 8 minutes, including:  Sub occipital release  Gentle cervical traction/retraction with belt, intermittently  Cervical side-glide  Prone: grade I,II to upper cervical spine     neuromuscular re-education activities to improve: Balance, Kinesthetic, Sense, Posture, and vestibular for 00 minutes. The following activities were included:    NP  -VRT: Gaze Stabilization  VOR x 1 horizontal, seated clean background, x 60 sec  (2)  VOR x 1 vertical, seated/standing, clean/busy background x 60 sec  VOR x2: angular accelerations of the head, stabilize gaze, and target moving in opposite directions.  Two-target VOR: gaze shift to target followed by an ipsilateral head movement  Imaginary Target: gaze held on target, close eyes, move head, open eyes to assess accuracy  Translation VOR: angular accelerations of the head, stabilize gaze on stationary target.      Patient Education and Home Exercises     Home Exercises Provided and Patient Education Provided     Education provided:   - Yes  -Also discussed importance for upright posture, use of lumbar roll and to stand every hour to decrease neck pain.  (5/17/23)    Written Home Exercises Provided: Patient instructed to cont prior HEP. Exercises were reviewed and Orry " was able to demonstrate them prior to the end of the session.  Brian demonstrated good  understanding of the education provided. See EMR under Patient Instructions for exercises provided during therapy sessions    ASSESSMENT     Patient demonstrated poor posture sitting in clinic and needed education on importance use of lumbar roll and correct sitting at desk, care, home, etc. Pt understands, but it's been difficult for him to sit with better posture due to it's been a habit and work is very demanding and needs to sit more to concentrate. Patient demonstrated good tolerance with manual therapy and less pain. No adverse effects.    Brian Is progressing well towards his goals.   Pt prognosis is Good.     Pt will continue to benefit from skilled outpatient physical therapy to address the deficits listed in the problem list box on initial evaluation, provide pt/family education and to maximize pt's level of independence in the home and community environment.     Pt's spiritual, cultural and educational needs considered and pt agreeable to plan of care and goals.     Anticipated barriers to physical therapy: none    Goals:   Short Term GOALS:  In 4 weeks, pt. will:  - improve cervical retraction by 25% for neutral seated work.  - report > 50% improvement in corrective posture episodes during work related tasks.  - demonstrate seated to standing transition of gaze stabilization up to 1' for vestibular purposes.  - decrease outcome measure limitation to <33%    PLAN     Continuation of therapy    Manju Alexandre, PTA

## 2023-05-24 ENCOUNTER — CLINICAL SUPPORT (OUTPATIENT)
Dept: REHABILITATION | Facility: HOSPITAL | Age: 38
End: 2023-05-24
Payer: COMMERCIAL

## 2023-05-24 DIAGNOSIS — M53.82 DECREASED ROM OF INTERVERTEBRAL DISCS OF CERVICAL SPINE: ICD-10-CM

## 2023-05-24 DIAGNOSIS — M54.2 NECK PAIN: Primary | ICD-10-CM

## 2023-05-24 DIAGNOSIS — R42 DIZZY SPELLS: ICD-10-CM

## 2023-05-24 PROCEDURE — 97112 NEUROMUSCULAR REEDUCATION: CPT | Mod: PO,CQ

## 2023-05-24 PROCEDURE — 97140 MANUAL THERAPY 1/> REGIONS: CPT | Mod: PO,CQ

## 2023-05-24 NOTE — PROGRESS NOTES
" [facility]   Physical Therapy Treatment Note     Name: Brian Gonzalez  Clinic Number: [id number]    Therapy Diagnosis:   Encounter Diagnoses   Name Primary?    Neck pain Yes    Decreased ROM of intervertebral discs of cervical spine     Dizzy spells        Physician: Tea Alberts NP    Visit Date: 5/24/2023  Physician Orders: PT Eval and Treat   Medical Diagnosis from Referral:   Migraine without aura and without status migrainosus, not intractable   Cervical myofascial pain syndrome  Bruxism   Evaluation Date: 3/22/2023  Authorization Period Expiration: 04/22/2023  Plan of Care Expiration: 05/19/2023  Visit # / Visits authorized: 9/20     Time In: 1:45 pm  Time Out: 2:30 pm  Total Billable Time: 40 minutes     Precautions: Standard    SUBJECTIVE     Pt reports: He did a lot of work yesterday without having a headache which he contributes to performing intermittent stretches thoracic extension, and being more aware of his posture.    3/10. Pt reports he has a headache and feels like a migraine may be coming on on his left side.  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   He was compliant with home exercise program.  Response to previous treatment: a little sore but felt better  Functional change: too soon to tell    Pain: 3/10  Location: bilateral neck     OBJECTIVE     Objective Measures updated at progress report unless specified.        Modified VAS (Vertebral Artery Screen), in sitting (rotation, then extension 10"):  R: Negative  L: Negative     VESTIBULAR EXAMINATION     Oculomotor Screen in room light (fixation present):   Known eye dysfunction: None   Ocular ROM: WNL    Tracking/Smooth Pursuits: Intact  Saccades: Impaired: left side, 2 beats  Convergence: WNL    Spontaneous Nystagmus: Absent   Gaze Holding Nystagmus: Absent   Head Impulse Test: Positive left side   " "  POSITIONAL CANAL TESTING  Looking for nystagmus (slow phase followed by quick phase to the affected side for BPPV)       Treatment     Orry received the treatments listed below:      therapeutic exercises to develop strength, endurance, ROM, flexibility, posture, and core stabilization for 5 minutes including:   UBE 4' backwards    -  seated: T-spine rotation x 10 each with cervical rotation  -  T-spine extension with clinician over-overpressure x 10-(self stretch over chair today 5/17/23)  Prone on elbows with chin retraction 20x5 sec hold- not performed  Prone press up x 10- to improve posture  Standing scapular Retractions 10x for work- not performed    manual therapy techniques: Manual traction, Myofacial release, and Soft tissue Mobilization were applied to the: neck for 10 minutes, including:  Sub occipital release  Gentle cervical traction  Trigger point release in cervical paraspinals      neuromuscular re-education activities to improve: Balance, Kinesthetic, Sense, Posture, and vestibular for 30 minutes. The following activities were included:  Seated cervical retraction 3/20", f/b 2/10 2" hold- to improve neutral head and sitting posture  Seated chin tuck x 3 min 5 sec hold  Supine cervical retraction 20x5 sec hold  Prone chin tucks with scapular retraction x 20 5 sec hold  Brugger's postural exercise with green band( slight chin retraction) x 20 5 sec hold  Scapular retractions with blue band 20x5 sec hold- cueing to prevent UT compensation      NP  -VRT: Gaze Stabilization  VOR x 1 horizontal, seated clean background, x 60 sec  (2)  VOR x 1 vertical, seated/standing, clean/busy background x 60 sec  VOR x2: angular accelerations of the head, stabilize gaze, and target moving in opposite directions.  Two-target VOR: gaze shift to target followed by an ipsilateral head movement  Imaginary Target: gaze held on target, close eyes, move head, open eyes to assess accuracy  Translation VOR: angular " accelerations of the head, stabilize gaze on stationary target.      Patient Education and Home Exercises     Home Exercises Provided and Patient Education Provided     Education provided:   - Yes  -avoiding chin extension with retraction    Written Home Exercises Provided: Patient instructed to cont prior HEP. Exercises were reviewed and Brian was able to demonstrate them prior to the end of the session.  Brian demonstrated good  understanding of the education provided. See EMR under Patient Instructions for exercises provided during therapy sessions    ASSESSMENT     Patient reported increased soreness/sensitivity with deeper pressure to cervical paraspinals.  Pt is trying to make more effort being aware of his posture and taking breaks during the day.  Pt able to tolerate resistance progression with only mild cueing.    Brian Is progressing well towards his goals.   Pt prognosis is Good.     Pt will continue to benefit from skilled outpatient physical therapy to address the deficits listed in the problem list box on initial evaluation, provide pt/family education and to maximize pt's level of independence in the home and community environment.     Pt's spiritual, cultural and educational needs considered and pt agreeable to plan of care and goals.     Anticipated barriers to physical therapy: none    Goals:   Short Term GOALS:  In 4 weeks, pt. will:  - improve cervical retraction by 25% for neutral seated work.  - report > 50% improvement in corrective posture episodes during work related tasks.  - demonstrate seated to standing transition of gaze stabilization up to 1' for vestibular purposes.  - decrease outcome measure limitation to <33%    PLAN     Continuation of therapy    Jeny Thomason, LINDSAY

## 2023-05-31 ENCOUNTER — CLINICAL SUPPORT (OUTPATIENT)
Dept: REHABILITATION | Facility: HOSPITAL | Age: 38
End: 2023-05-31
Payer: COMMERCIAL

## 2023-05-31 DIAGNOSIS — M54.2 NECK PAIN: Primary | ICD-10-CM

## 2023-05-31 DIAGNOSIS — M53.82 DECREASED ROM OF INTERVERTEBRAL DISCS OF CERVICAL SPINE: ICD-10-CM

## 2023-05-31 DIAGNOSIS — R42 DIZZY SPELLS: ICD-10-CM

## 2023-05-31 PROCEDURE — 97110 THERAPEUTIC EXERCISES: CPT | Mod: PO | Performed by: PHYSICAL THERAPIST

## 2023-05-31 PROCEDURE — 97140 MANUAL THERAPY 1/> REGIONS: CPT | Mod: PO | Performed by: PHYSICAL THERAPIST

## 2023-05-31 NOTE — PROGRESS NOTES
"  [facility]   Physical Therapy Treatment Note     Name: Brian Gonzalez  Clinic Number: [id number]    Therapy Diagnosis:   Encounter Diagnoses   Name Primary?    Neck pain Yes    Decreased ROM of intervertebral discs of cervical spine     Dizzy spells      Physician: Tea Alberts NP    Visit Date: 5/31/2023  Physician Orders: PT Eval and Treat   Medical Diagnosis from Referral:   Migraine without aura and without status migrainosus, not intractable   Cervical myofascial pain syndrome  Bruxism   Evaluation Date: 3/22/2023  Authorization Period Expiration: 04/22/2023  Plan of Care Expiration: 05/19/2023  Visit # / Visits authorized: 10/20     Time In: 1315 (15 minutes late)  Time Out: 1400  Total Billable Time: 40 minutes     Precautions: Standard    SUBJECTIVE     Pt reports: having relief til Sunday after cracking his neck (R), lifting a fridge as well with muscle soreness. No radicular pain. No numbness/tingling noted.   He was compliant with home exercise program.  Response to previous treatment: a little sore but felt better  Functional change: too soon to tell    Pain: 3/10  Location: bilateral neck     OBJECTIVE     Objective Measures updated at progress report unless specified.     Modified VAS (Vertebral Artery Screen), in sitting (rotation, then extension 10"):  R: Negative  L: Negative     VESTIBULAR EXAMINATION     Oculomotor Screen in room light (fixation present):   Known eye dysfunction: None   Ocular ROM: WNL    Tracking/Smooth Pursuits: Intact  Saccades: Impaired: left side, 2 beats  Convergence: WNL    Spontaneous Nystagmus: Absent   Gaze Holding Nystagmus: Absent   Head Impulse Test: Positive left side     POSITIONAL CANAL TESTING  Looking for nystagmus (slow phase followed by quick phase to the affected side for BPPV)     Treatment     Brian received the treatments listed below:      therapeutic exercises to develop strength, endurance, ROM, flexibility, " "posture, and core stabilization for 25 minutes including: UBE 3' fwd/bwd each  - UBE 3 minutes fwd/bwd each  - supine: pec stretch over half foam  - seated: T-spine rotation x 10 each with cervical rotation  - T-spine extension with clinician over-overpressure x 10-(self stretch over chair today 5/17/23)  - prone upper trunk lift 1/10  - prone upper trunk, cervical retraction 2/10    manual therapy techniques: Manual traction, Myofacial release, and Soft tissue Mobilization were applied to the: neck for 15 minutes, including:  Sub occipital inhibitive release  Cervical retraction with traction, blue belt  Trigger point release in cervical paraspinals  Right SCM    neuromuscular re-education activities to improve: Balance, Kinesthetic, Sense, Posture, and vestibular for 15 minutes. The following activities were included:  Seated cervical retraction 3/20", f/b 2/10 2" hold- to improve neutral head and sitting posture  Seated chin tuck x 3 min 5 sec hold  Supine cervical retraction 20x5 sec hold  Prone chin tucks with scapular retraction x 20 5 sec hold  Brugger's postural exercise with green band( slight chin retraction) x 20 5 sec hold  Scapular retractions with blue band 20x5 sec hold- cueing to prevent UT compensation    NP  -VRT: Gaze Stabilization  VOR x 1 horizontal, seated clean background, x 60 sec  (2)  VOR x 1 vertical, seated/standing, clean/busy background x 60 sec  VOR x2: angular accelerations of the head, stabilize gaze, and target moving in opposite directions.  Two-target VOR: gaze shift to target followed by an ipsilateral head movement  Imaginary Target: gaze held on target, close eyes, move head, open eyes to assess accuracy  Translation VOR: angular accelerations of the head, stabilize gaze on stationary target.      Patient Education and Home Exercises     Home Exercises Provided and Patient Education Provided     Education provided:   - Yes  -avoiding chin extension with retraction    Written " Home Exercises Provided: Patient instructed to cont prior HEP. Exercises were reviewed and Brian was able to demonstrate them prior to the end of the session.  Brian demonstrated good  understanding of the education provided. See EMR under Patient Instructions for exercises provided during therapy sessions    ASSESSMENT     Patient demonstrated right SCM with muscle shortening noted. Patient demonstrated improvement in length and given instruction on stretching for mobility purposes. No adverse effects.    Brian Is progressing well towards his goals.   Pt prognosis is Good.     Pt will continue to benefit from skilled outpatient physical therapy to address the deficits listed in the problem list box on initial evaluation, provide pt/family education and to maximize pt's level of independence in the home and community environment.     Pt's spiritual, cultural and educational needs considered and pt agreeable to plan of care and goals.     Anticipated barriers to physical therapy: none    Goals:   Short Term GOALS:  In 4 weeks, pt. will:  - improve cervical retraction by 25% for neutral seated work. Met, 05/31/2023  - report > 50% improvement in corrective posture episodes during work related tasks. 40% improvement  - demonstrate seated to standing transition of gaze stabilization up to 1' for vestibular purposes. Met  - decrease outcome measure limitation to <33%    PLAN     Continuation of therapy    Frandy Joshi, PT

## 2023-06-07 ENCOUNTER — CLINICAL SUPPORT (OUTPATIENT)
Dept: REHABILITATION | Facility: HOSPITAL | Age: 38
End: 2023-06-07
Payer: COMMERCIAL

## 2023-06-07 DIAGNOSIS — R42 DIZZY SPELLS: ICD-10-CM

## 2023-06-07 DIAGNOSIS — M53.82 DECREASED ROM OF INTERVERTEBRAL DISCS OF CERVICAL SPINE: ICD-10-CM

## 2023-06-07 DIAGNOSIS — M54.2 NECK PAIN: Primary | ICD-10-CM

## 2023-06-07 PROCEDURE — 97112 NEUROMUSCULAR REEDUCATION: CPT | Mod: PO,CQ

## 2023-06-07 PROCEDURE — 97140 MANUAL THERAPY 1/> REGIONS: CPT | Mod: PO,CQ

## 2023-06-07 NOTE — PLAN OF CARE
"TIME RECORD    Date: 06/07/2023    PHYSICAL THERAPY UPDATED PLAN OF TREATMENT    Patient name: Brian Gonzalez  Onset Date:  03/22/2023  SOC Date:  03/14/2023  Therapy Diagnosis:   Encounter Diagnoses   Name Primary?    Neck pain Yes    Decreased ROM of intervertebral discs of cervical spine     Dizzy spells      Physician: Tea Alberts NP    Visit Date: 5/31/2023  Physician Orders: PT Eval and Treat   Medical Diagnosis from Referral:   Migraine without aura and without status migrainosus, not intractable   Cervical myofascial pain syndrome  Bruxism   Evaluation Date: 3/22/2023  Authorization Period Expiration: 04/22/2023  Plan of Care Expiration: 05/19/2023  Visit # / Visits authorized: 10/20     Precautions:  standard  Functional Level Prior to SOC:  modified independent, increase time noted with home management    Updated Assessment:    Cervical ROM: Inclinometer/Goniometer/%                            Pain/dysfunction/movement  Flexion  Protraction  Retraction 0  0  50% Can't touch sternum to chin. Non-uniform curvature   Extension 50% Non-uniform curvature. Not within 10 degrees of parallel of the horizontal line   Lateral flexion (L)       Lateral flexion (R)       Right rotation 58 Chin/nose not in line with mid-clavicle. +/- 80 degrees   Left rotation 60 Chin/nose not in line with mid-clavicle. +/- 80 degrees      OA -  AA -    Decreased thoracic rotation by 25%        Modified VAS (Vertebral Artery Screen), in sitting (rotation, then extension 10"):  R: Negative  L: Negative    Previous Short Term Goals Status:     n 4 weeks, pt. will:  - improve cervical retraction by 25% for neutral seated work. Met, 05/31/2023  - report > 50% improvement in corrective posture episodes during work related tasks. 40% improvement  - demonstrate seated to standing transition of gaze stabilization up to 1' for vestibular purposes. Met  - decrease outcome measure limitation to <33%  New Short Term Goals Status:   " Demonstrate thoracic rotation > 25% for mobility purposes.  Long Term Goal Status:   continue per initial plan of care.  Reasons for Recertification of Therapy:   decreased thoracic mobility, upper cross trunk muscle imbalance, decreased cervical rotation    Certification Period: 05/31/2023 to 07/28/2023  Recommended Treatment Plan: 2 times per week for 8 weeks: Electrical Stimulation dry needling, Manual Therapy, Moist Heat/ Ice, Neuromuscular Re-ed, Patient Education, Therapeutic Activities, Therapeutic Exercise, and dry needling  Other Recommendations: thank you for consult.      Therapist's Name: Frandy Joshi PT, DPT, MTC, Cert. MDT    Date: 05/31/2023    I CERTIFY THE NEED FOR THESE SERVICES FURNISHED UNDER THIS PLAN OF TREATMENT AND WHILE UNDER MY CARE    Physician's comments: ________________________________________________________________________________________________________________________________________________      Physician's Name: ___________________________________

## 2023-06-07 NOTE — PROGRESS NOTES
"  [facility]   Physical Therapy Treatment Note     Name: Brian Gonzalez  Clinic Number: [id number]    Therapy Diagnosis:   Encounter Diagnoses   Name Primary?    Neck pain Yes    Decreased ROM of intervertebral discs of cervical spine     Dizzy spells      Physician: Tea Alberts NP    Visit Date: 6/7/2023  Physician Orders: PT Eval and Treat   Medical Diagnosis from Referral:   Migraine without aura and without status migrainosus, not intractable   Cervical myofascial pain syndrome  Bruxism   Evaluation Date: 3/22/2023  Authorization Period Expiration: 04/22/2023  Plan of Care Expiration: 05/19/2023  Visit # / Visits authorized: 11/20     Time In: 1346  Time Out: 1431  Total Billable Time: 45 minutes     Precautions: Standard    SUBJECTIVE     Pt reports: Has been feeling pretty good the last few days.  Pt was able to get up every hour today which helped.  Pt felt like a headache was going to come on today but he doesn't feel like it is building up to where he needs to talk something.  Pt has had at least one headache since last week.  Pt reports he had a long day with increased time sitting.  Pt has been trying to remember to sit all the way back into the chair.  Pt notices his chest will fall forward when he is working on the computer but he is more mindful of it.  He is still having some dizziness and he has noticed when he is getting on the floor and looking underneath surfaces for things repetitively he will get the dizziness/nausea feeling.  Pt reports he has been trying to sleep on his side or back but always   He was compliant with home exercise program.  Response to previous treatment: a little sore but felt better  Functional change: too soon to tell    Pain: 3/10  Location: bilateral neck     OBJECTIVE     Objective Measures updated at progress report unless specified.     Modified VAS (Vertebral Artery Screen), in sitting (rotation, then extension 10"):  R: " "Negative  L: Negative     VESTIBULAR EXAMINATION     Oculomotor Screen in room light (fixation present):   Known eye dysfunction: None   Ocular ROM: WNL    Tracking/Smooth Pursuits: Intact  Saccades: Impaired: left side, 2 beats  Convergence: WNL    Spontaneous Nystagmus: Absent   Gaze Holding Nystagmus: Absent   Head Impulse Test: Positive left side     POSITIONAL CANAL TESTING  Looking for nystagmus (slow phase followed by quick phase to the affected side for BPPV)     Treatment     Orry received the treatments listed below:      therapeutic exercises to develop strength, endurance, ROM, flexibility, posture, and core stabilization for 5 minutes including:   - UBE 3 minutes fwd/bwd each- not performed  - supine: pec stretch over half foam- not performed  - seated: T-spine rotation x 10 each with cervical rotation- not performed  - T-spine extension with clinician over-overpressure x 10-- not performed  - prone upper trunk lift with cervical retraction 1/15 5 sec hold  - Shoulder extension with green band 2x10 5 sec hold      manual therapy techniques: Manual traction, Myofacial release, and Soft tissue Mobilization were applied to the: neck for 10 minutes, including:  Sub occipital inhibitive release  Cervical retraction with traction   Trigger point release in cervical paraspinals- not performed  Right SCM- not performed    neuromuscular re-education activities to improve: Balance, Kinesthetic, Sense, Posture, and vestibular for 30 minutes. The following activities were included:  Seated cervical retraction 3/20", f/b 2/10 2" hold- to improve neutral head and sitting posture  Seated chin tuck x 3 min 5 sec hold  Supine cervical retraction 20x5 sec hold  Prone chin tucks with scapular retraction x 20 5 sec hold  Brugger's postural exercise with green band( slight chin retraction) x 20 5 sec hold  Scapular retractions with blue band 20x5 sec hold- cueing to prevent UT compensation    NP  -VRT: Gaze " Stabilization  VOR x 1 horizontal, seated clean background, x 60 sec  (2)  VOR x 1 vertical, seated/standing, clean/busy background x 60 sec  VOR x2: angular accelerations of the head, stabilize gaze, and target moving in opposite directions.  Two-target VOR: gaze shift to target followed by an ipsilateral head movement  Imaginary Target: gaze held on target, close eyes, move head, open eyes to assess accuracy  Translation VOR: angular accelerations of the head, stabilize gaze on stationary target.      Patient Education and Home Exercises     Home Exercises Provided and Patient Education Provided     Education provided:   - Yes  -avoiding chin extension with retraction    Written Home Exercises Provided: Patient instructed to cont prior HEP. Exercises were reviewed and Brian was able to demonstrate them prior to the end of the session.  Brina demonstrated good  understanding of the education provided. See EMR under Patient Instructions for exercises provided during therapy sessions    ASSESSMENT     Patient educated on in the future possibly buying a postural brace/shirt as patient will continue to have a seated job and may need tactile cueing and extra support long term.  Pt is making conscious efforts to stand and noticed improvement in symptoms with breaks.  Educated patient on chin retractions and avoiding excessive motion to avoid SCM engagement.  Pt demonstrated understanding.    Brian Is progressing well towards his goals.   Pt prognosis is Good.     Pt will continue to benefit from skilled outpatient physical therapy to address the deficits listed in the problem list box on initial evaluation, provide pt/family education and to maximize pt's level of independence in the home and community environment.     Pt's spiritual, cultural and educational needs considered and pt agreeable to plan of care and goals.     Anticipated barriers to physical therapy: none    Goals:   Short Term GOALS:  In 4 weeks, pt. will:  -  improve cervical retraction by 25% for neutral seated work. Met, 05/31/2023  - report > 50% improvement in corrective posture episodes during work related tasks. 40% improvement  - demonstrate seated to standing transition of gaze stabilization up to 1' for vestibular purposes. Met  - decrease outcome measure limitation to <33%    PLAN     Continuation of therapy    Jeny Thomason, PTA

## 2023-06-14 ENCOUNTER — CLINICAL SUPPORT (OUTPATIENT)
Dept: REHABILITATION | Facility: HOSPITAL | Age: 38
End: 2023-06-14
Payer: COMMERCIAL

## 2023-06-14 DIAGNOSIS — M54.2 NECK PAIN: Primary | ICD-10-CM

## 2023-06-14 DIAGNOSIS — M53.82 DECREASED ROM OF INTERVERTEBRAL DISCS OF CERVICAL SPINE: ICD-10-CM

## 2023-06-14 DIAGNOSIS — R42 DIZZY SPELLS: ICD-10-CM

## 2023-06-14 PROCEDURE — 97110 THERAPEUTIC EXERCISES: CPT | Mod: PO,CQ

## 2023-06-14 PROCEDURE — 97112 NEUROMUSCULAR REEDUCATION: CPT | Mod: PO,CQ

## 2023-06-14 PROCEDURE — 97140 MANUAL THERAPY 1/> REGIONS: CPT | Mod: PO,CQ

## 2023-06-14 NOTE — PROGRESS NOTES
"  [facility]   Physical Therapy Treatment Note     Name: Brian Gonzalez  Clinic Number: [id number]    Therapy Diagnosis:   Encounter Diagnoses   Name Primary?    Neck pain Yes    Decreased ROM of intervertebral discs of cervical spine     Dizzy spells      Physician: Tea Alberts NP    Visit Date: 6/14/2023  Physician Orders: PT Eval and Treat   Medical Diagnosis from Referral:   Migraine without aura and without status migrainosus, not intractable   Cervical myofascial pain syndrome  Bruxism   Evaluation Date: 3/22/2023  Authorization Period Expiration: 04/22/2023  Plan of Care Expiration: 05/19/2023  Visit # / Visits authorized: 12/20     Time In: 1303   Time Out: 1431  Total Billable Time: 45 minutes     Precautions: Standard    SUBJECTIVE     Pt reports: His standing desk came in so he trying to stand up every 1 hours for about 20 minutes.  Pt started this Monday.  Pt woke up with a headache this morning.  Pt noticed he woke up from sleeping on his stomach.  Pt reports the pain is on the right side of the neck by the base of the skull.  Pt has only had a headache once since last treatment.  He was compliant with home exercise program.  Response to previous treatment: felt good and seemed to help through the weekend  Functional change: too soon to tell    Pain: 3/10  Location: headache    OBJECTIVE     Objective Measures updated at progress report unless specified.     Modified VAS (Vertebral Artery Screen), in sitting (rotation, then extension 10"):  R: Negative  L: Negative     VESTIBULAR EXAMINATION     Oculomotor Screen in room light (fixation present):   Known eye dysfunction: None   Ocular ROM: WNL    Tracking/Smooth Pursuits: Intact  Saccades: Impaired: left side, 2 beats  Convergence: WNL    Spontaneous Nystagmus: Absent   Gaze Holding Nystagmus: Absent   Head Impulse Test: Positive left side     POSITIONAL CANAL TESTING  Looking for nystagmus (slow phase followed by " "quick phase to the affected side for BPPV)     Treatment     Orry received the treatments listed below:      therapeutic exercises to develop strength, endurance, ROM, flexibility, posture, and core stabilization for 15 minutes including:   - UBE 3 minutes fwd/bwd each- not performed  - Supine: pec stretch over half foam x 2 min  - Supine horizontal abd with red band on 1/2 foam roll 2x10  - seated: T-spine rotation x 10 each with cervical rotation- not performed  - T-spine extension with clinician over-overpressure x 10-- not performed  - prone upper trunk lift with cervical retraction 1/15 5 sec hold  - Shoulder extension with green band 3x10 5 sec hold      manual therapy techniques: Manual traction, Myofacial release, and Soft tissue Mobilization were applied to the: neck for 15 minutes, including:  Sub occipital inhibitive release  Cervical retraction with traction   Trigger point release in cervical paraspinals  Right SCM- not performed    neuromuscular re-education activities to improve: Balance, Kinesthetic, Sense, Posture, and vestibular for 15 minutes. The following activities were included:  Seated cervical retraction 3/20", f/b 2/10 2" hold- to improve neutral head and sitting posture  Seated chin tuck x 3 min 5 sec hold  Supine cervical retraction 20x5 sec hold  Prone chin tucks with scapular retraction x 20 5 sec hold- not performed  Brugger's postural exercise with green band( slight chin retraction) x 20 5 sec hold  Scapular retractions with blue band 20x5 sec hold- cueing to prevent UT compensation    NP  -VRT: Gaze Stabilization  VOR x 1 horizontal, seated clean background, x 60 sec  (2)  VOR x 1 vertical, seated/standing, clean/busy background x 60 sec  VOR x2: angular accelerations of the head, stabilize gaze, and target moving in opposite directions.  Two-target VOR: gaze shift to target followed by an ipsilateral head movement  Imaginary Target: gaze held on target, close eyes, move head, open " eyes to assess accuracy  Translation VOR: angular accelerations of the head, stabilize gaze on stationary target.      Patient Education and Home Exercises     Home Exercises Provided and Patient Education Provided     Education provided:   - Yes  -avoiding chin extension with retraction    Written Home Exercises Provided: Patient instructed to cont prior HEP. Exercises were reviewed and Brian was able to demonstrate them prior to the end of the session.  Brian demonstrated good  understanding of the education provided. See EMR under Patient Instructions for exercises provided during therapy sessions    ASSESSMENT     Patient now has a standing desk so hopefully this will help remind him to change positions more frequently.  Pt is having less headaches overall with less complaints.  Pt was educated that he could use a sub occipital wedge/tennis ball/ massage ball to his cervical paraspinals or sub occipital to help relieve pressure during the week.  Pt with trigger point in R cervical paraspinals which improved following manual therapy.    Brian Is progressing well towards his goals.   Pt prognosis is Good.     Pt will continue to benefit from skilled outpatient physical therapy to address the deficits listed in the problem list box on initial evaluation, provide pt/family education and to maximize pt's level of independence in the home and community environment.     Pt's spiritual, cultural and educational needs considered and pt agreeable to plan of care and goals.     Anticipated barriers to physical therapy: none    Goals:   Short Term GOALS:  In 4 weeks, pt. will:  - improve cervical retraction by 25% for neutral seated work. Met, 05/31/2023  - report > 50% improvement in corrective posture episodes during work related tasks. 40% improvement  - demonstrate seated to standing transition of gaze stabilization up to 1' for vestibular purposes. Met  - decrease outcome measure limitation to <33%    PLAN      Continuation of therapy    Jeny Thomason, PTA

## 2023-06-21 ENCOUNTER — CLINICAL SUPPORT (OUTPATIENT)
Dept: REHABILITATION | Facility: HOSPITAL | Age: 38
End: 2023-06-21
Payer: COMMERCIAL

## 2023-06-21 DIAGNOSIS — R42 DIZZY SPELLS: ICD-10-CM

## 2023-06-21 DIAGNOSIS — M54.2 NECK PAIN: Primary | ICD-10-CM

## 2023-06-21 DIAGNOSIS — M53.82 DECREASED ROM OF INTERVERTEBRAL DISCS OF CERVICAL SPINE: ICD-10-CM

## 2023-06-21 PROCEDURE — 97140 MANUAL THERAPY 1/> REGIONS: CPT | Mod: PO | Performed by: PHYSICAL THERAPIST

## 2023-06-21 PROCEDURE — 97110 THERAPEUTIC EXERCISES: CPT | Mod: PO | Performed by: PHYSICAL THERAPIST

## 2023-06-21 NOTE — PROGRESS NOTES
"  [facility]   Physical Therapy Treatment Note     Name: Brian Gonzalez  Clinic Number: [id number]    Therapy Diagnosis:   Encounter Diagnoses   Name Primary?    Neck pain Yes    Decreased ROM of intervertebral discs of cervical spine     Dizzy spells      Physician: Tea Alberts NP    Visit Date: 6/21/2023  Physician Orders: PT Eval and Treat   Medical Diagnosis from Referral:   Migraine without aura and without status migrainosus, not intractable   Cervical myofascial pain syndrome  Bruxism   Evaluation Date: 3/22/2023  Authorization Period Expiration: 12/31/2023  Plan of Care Expiration: 07/28/2023  Visit # / Visits authorized: 13/20     Time In: 1400  Time Out: 1500  Total Billable Time: 23 minutes     Precautions: Standard    SUBJECTIVE     Pt reports: getting a stand up desk last week and has been working on his transition from prolong sitting to standing up to 15-20 minutes. No HA noted. NO neck pain at this time. Right side neck soreness noted.  He was compliant with home exercise program.  Response to previous treatment: felt good and seemed to help through the weekend  Functional change: too soon to tell    Pain: 3/10  Location: headache    OBJECTIVE    Cervical ROM: Inclinometer/Goniometer/%                            Pain/dysfunction/movement  Flexion  Protraction  Retraction 0  0  50% Can't touch sternum to chin. Non-uniform curvature   Extension 50% Non-uniform curvature. Not within 10 degrees of parallel of the horizontal line   Lateral flexion (L)       Lateral flexion (R)       Right rotation 58 Chin/nose not in line with mid-clavicle. +/- 80 degrees   Left rotation 60 Chin/nose not in line with mid-clavicle. +/- 80 degrees      OA -  AA -     Decreased thoracic rotation by 25%     Modified VAS (Vertebral Artery Screen), in sitting (rotation, then extension 10"):  R: Negative  L: Negative  Treatment     Brian received the treatments listed below:  " "    therapeutic exercises to develop strength, endurance, ROM, flexibility, posture, and core stabilization for 15 minutes including:   - UBE 3 minutes fwd/bwd each- not performed  - Supine: pec stretch over half foam x 2 min  - Supine horizontal abd with red band on 1/2 foam roll 2x10  - seated: T-spine rotation x 10 each with cervical rotation- not performed  - T-spine extension with clinician over-overpressure x 10-- not performed  - prone upper trunk lift with cervical retraction 1/15 5 sec hold  - Shoulder extension with green band 3x10 5 sec hold      manual therapy techniques: Manual traction, Myofacial release, and Soft tissue Mobilization were applied to the: neck for 8 minutes, including:  Sub occipital inhibitive release  Cervical retraction with traction   Trigger point release in cervical paraspinals  Right SCM- not performed    neuromuscular re-education activities to improve: Balance, Kinesthetic, Sense, Posture, and vestibular for 15 minutes. The following activities were included:  Seated cervical retraction 3/20", f/b 2/10 2" hold- to improve neutral head and sitting posture  Seated chin tuck x 3 min 5 sec hold  Supine cervical retraction 20x5 sec hold  Prone chin tucks with scapular retraction x 20 5 sec hold- not performed  Brugger's postural exercise with green band( slight chin retraction) x 20 5 sec hold  Scapular retractions with blue band 20x5 sec hold- cueing to prevent UT compensation    NP  -VRT: Gaze Stabilization  VOR x 1 horizontal, seated clean background, x 60 sec  (2)  VOR x 1 vertical, seated/standing, clean/busy background x 60 sec  VOR x2: angular accelerations of the head, stabilize gaze, and target moving in opposite directions.  Two-target VOR: gaze shift to target followed by an ipsilateral head movement  Imaginary Target: gaze held on target, close eyes, move head, open eyes to assess accuracy  Translation VOR: angular accelerations of the head, stabilize gaze on stationary " target.      Patient Education and Home Exercises     Home Exercises Provided and Patient Education Provided     Education provided:   - Yes  -avoiding chin extension with retraction    Written Home Exercises Provided: Patient instructed to cont prior HEP. Exercises were reviewed and Brian was able to demonstrate them prior to the end of the session.  Brian demonstrated good  understanding of the education provided. See EMR under Patient Instructions for exercises provided during therapy sessions    ASSESSMENT     Patient worked on cervical strengthening with cueing on cervical retraction. Patient also demonstrated good tolerance with TE progression with no adverse effects.    Brian Is progressing well towards his goals.   Pt prognosis is Good.     Pt will continue to benefit from skilled outpatient physical therapy to address the deficits listed in the problem list box on initial evaluation, provide pt/family education and to maximize pt's level of independence in the home and community environment.     Pt's spiritual, cultural and educational needs considered and pt agreeable to plan of care and goals.     Anticipated barriers to physical therapy: none    Goals:   Short Term GOALS:  In 4 weeks, pt. will:  - improve cervical retraction by 25% for neutral seated work. Met, 05/31/2023  - report > 50% improvement in corrective posture episodes during work related tasks. 40% improvement  - demonstrate seated to standing transition of gaze stabilization up to 1' for vestibular purposes. Met  - decrease outcome measure limitation to <33%    Demonstrate thoracic rotation > 25% for mobility purposes.     PLAN     Continuation of therapy    Frandy Joshi, PT

## 2023-07-18 ENCOUNTER — PATIENT MESSAGE (OUTPATIENT)
Dept: NEUROLOGY | Facility: CLINIC | Age: 38
End: 2023-07-18
Payer: COMMERCIAL

## 2023-07-18 ENCOUNTER — TELEPHONE (OUTPATIENT)
Dept: NEUROLOGY | Facility: CLINIC | Age: 38
End: 2023-07-18
Payer: COMMERCIAL

## 2023-07-18 NOTE — TELEPHONE ENCOUNTER
Called patient and left voice mail that appointment had to be rescheduled due to provider being out of the office. New appointment Date/time left and will also send message in My Chart.

## 2023-08-16 ENCOUNTER — OFFICE VISIT (OUTPATIENT)
Dept: NEUROLOGY | Facility: CLINIC | Age: 38
End: 2023-08-16
Payer: COMMERCIAL

## 2023-08-16 VITALS
HEART RATE: 70 BPM | SYSTOLIC BLOOD PRESSURE: 127 MMHG | HEIGHT: 72 IN | BODY MASS INDEX: 31.79 KG/M2 | DIASTOLIC BLOOD PRESSURE: 86 MMHG | WEIGHT: 234.69 LBS

## 2023-08-16 DIAGNOSIS — G43.009 MIGRAINE WITHOUT AURA AND WITHOUT STATUS MIGRAINOSUS, NOT INTRACTABLE: Primary | ICD-10-CM

## 2023-08-16 PROCEDURE — 1159F PR MEDICATION LIST DOCUMENTED IN MEDICAL RECORD: ICD-10-PCS | Mod: CPTII,S$GLB,, | Performed by: NURSE PRACTITIONER

## 2023-08-16 PROCEDURE — 3074F PR MOST RECENT SYSTOLIC BLOOD PRESSURE < 130 MM HG: ICD-10-PCS | Mod: CPTII,S$GLB,, | Performed by: NURSE PRACTITIONER

## 2023-08-16 PROCEDURE — 1159F MED LIST DOCD IN RCRD: CPT | Mod: CPTII,S$GLB,, | Performed by: NURSE PRACTITIONER

## 2023-08-16 PROCEDURE — 99213 PR OFFICE/OUTPT VISIT, EST, LEVL III, 20-29 MIN: ICD-10-PCS | Mod: S$GLB,,, | Performed by: NURSE PRACTITIONER

## 2023-08-16 PROCEDURE — 99999 PR PBB SHADOW E&M-EST. PATIENT-LVL III: ICD-10-PCS | Mod: PBBFAC,,, | Performed by: NURSE PRACTITIONER

## 2023-08-16 PROCEDURE — 1160F PR REVIEW ALL MEDS BY PRESCRIBER/CLIN PHARMACIST DOCUMENTED: ICD-10-PCS | Mod: CPTII,S$GLB,, | Performed by: NURSE PRACTITIONER

## 2023-08-16 PROCEDURE — 3079F PR MOST RECENT DIASTOLIC BLOOD PRESSURE 80-89 MM HG: ICD-10-PCS | Mod: CPTII,S$GLB,, | Performed by: NURSE PRACTITIONER

## 2023-08-16 PROCEDURE — 3079F DIAST BP 80-89 MM HG: CPT | Mod: CPTII,S$GLB,, | Performed by: NURSE PRACTITIONER

## 2023-08-16 PROCEDURE — 1160F RVW MEDS BY RX/DR IN RCRD: CPT | Mod: CPTII,S$GLB,, | Performed by: NURSE PRACTITIONER

## 2023-08-16 PROCEDURE — 99213 OFFICE O/P EST LOW 20 MIN: CPT | Mod: S$GLB,,, | Performed by: NURSE PRACTITIONER

## 2023-08-16 PROCEDURE — 3074F SYST BP LT 130 MM HG: CPT | Mod: CPTII,S$GLB,, | Performed by: NURSE PRACTITIONER

## 2023-08-16 PROCEDURE — 99999 PR PBB SHADOW E&M-EST. PATIENT-LVL III: CPT | Mod: PBBFAC,,, | Performed by: NURSE PRACTITIONER

## 2023-08-16 PROCEDURE — 3008F PR BODY MASS INDEX (BMI) DOCUMENTED: ICD-10-PCS | Mod: CPTII,S$GLB,, | Performed by: NURSE PRACTITIONER

## 2023-08-16 PROCEDURE — 3008F BODY MASS INDEX DOCD: CPT | Mod: CPTII,S$GLB,, | Performed by: NURSE PRACTITIONER

## 2023-08-16 RX ORDER — SUMATRIPTAN SUCCINATE 100 MG/1
TABLET ORAL
Qty: 10 TABLET | Refills: 11 | Status: SHIPPED | OUTPATIENT
Start: 2023-08-16

## 2023-08-16 NOTE — PROGRESS NOTES
Date of service: 8/16/2023  Referring provider: No ref. provider found    Subjective:      Chief complaint: Headache       Patient ID: Brian Gonzalez is a 37 y.o. male with anxiety, dizziness who presents for follow up of headache     History of Present Illness    INTERVAL HISTORY 8/16/23    Last visit was three months ago and at that time he was better.    Today he reports he is the same. PT was helpful. He stopped PT two months ago and he has noticed his neck triggers many headaches. Current pain 2 with range 0-9. He has 2-3 headaches per week. He takes Tylenol and sleep to treat.  Otherwise information below is reviewed and verified with no changes made    INTERVAL HISTORY 5/9/23    Last visit was about two months ago and at that time we started zonisamide and rizatriptan. He was referred to PT.     Today he reports he is better. He did not start zonisamide. He is having less headache days. Headaches are front, sides, and sometimes top of neck. Current pain 0 with range 0-8. He has 0-3 headache days per week. He takes tylenol 0-2 days per week. He has been in PT for 7 weeks which he feels is very effective. He has 4 more PT sessions left, once per week. He has occasional dizziness but more so associated with position changes or with looking up and then down. He had one episode of sharp pain on th left side of his head. Lasted less than 15 minutes. Pain dulled quickly and then went away. No associated autonomic features. He did not take any medication for this. He was driving when pain occurred and also driving the last time this happened in 2019. Otherwise information below is reviewed and verified with no changes made     ORIGINAL HEADACHE HISTORY - 3/14/23  Age at onset and course over time: many years, vertigo for past year    He has been evaluated by ENT who suspects migraine origin. Allergy medication has not helped headaches.     Family history of migraines- paternal grandmother, father  Family history  "of aneurysm - none  Last eye exam - "long time ago"  Location: front, sides   Quality:  [x] pressure [] tight [x] throbbing [] sharp [] stabbing   Severity: current 2 with range 1-8  Duration: hours  Frequency: near daily, escalation to severe about once per week   Headaches awaken at night?:  no  Worst time of day: evening   Associated with: [] photophobia []  phonophobia [] osmophobia [] blurred vision  [] double vision [] loss of appetite [] nausea [] vomiting [] dizziness [x] vertigo  [] tinnitus [] irritability [x] sinus pressure [] problems with concentration   [x] neck tightness   Alleviated by:  [x] sleep [] darkness [] massage [x] heat [] ice [x] medication  Exacerbated by:  [x] fatigue [] light [] noise [] smells [] coughing [] sneezing  [] bending over [] ovulation [] menses [] alcohol [x] change in weather [x]  stress  Ipsilateral autonomic: [] nasal congestion [] lacrimation [] ptosis [] injection [] edema [] foreign body sensation [] ear fullness   ICP:  [] transient visual obscurations  [x] tinnitus high pitched  [] positional headache  [x] non-positional   Sleep habits: trouble falling asleep, had sleep study years ago and "no significant data for sleep apnea"   Caffeine intake: none   HIT 6 - 50    Current acute treatment:  Tylenol   Tizanidine - effective     Current prevention:  None     Previously tried/failed acute treatment:  Meclizine  Ibuprofen  Zofran  Excedrin  Tylenol - "made headache go away faster"  Advil - 4-5 days per week  Rizatriptan - side effects    Previously tried/failed preventative treatment:  None     Review of patient's allergies indicates:  No Known Allergies  Current Outpatient Medications   Medication Sig Dispense Refill    azelastine (ASTELIN) 137 mcg (0.1 %) nasal spray 1 spray (137 mcg total) by Nasal route 2 (two) times daily. 30 mL 11    cetirizine (ZYRTEC) 5 MG tablet Take 5 mg by mouth daily as needed.      fluticasone propionate (FLONASE) 50 mcg/actuation nasal " spray 1 spray (50 mcg total) by Each Nostril route 2 (two) times a day. 16 g 11    tiZANidine (ZANAFLEX) 4 MG tablet Half or full tablet by mouth at night as needed for muscle spasm 30 tablet 11    pantoprazole (PROTONIX) 40 MG tablet Take 1 tablet (40 mg total) by mouth once daily. (Patient not taking: Reported on 3/14/2023) 14 tablet 0    sumatriptan (IMITREX) 100 MG tablet 1 tab PO PRN migraine. May repeat once in 2 hours, no more than 2 tab in 24 hours. Use no more than 10 days per month. 10 tablet 11    zonisamide (ZONEGRAN) 100 MG Cap Take 1 capsule (100 mg total) by mouth every evening. (Patient not taking: Reported on 5/9/2023) 30 capsule 11     No current facility-administered medications for this visit.       Past Medical History  Past Medical History:   Diagnosis Date    Anxiety     Headache        Past Surgical History  Past Surgical History:   Procedure Laterality Date    FOREARM FRACTURE SURGERY Left 2000    FOREARM SURGERY Right 1995    Hook removal       Family History  Family History   Problem Relation Age of Onset    Breast cancer Mother 50    Diabetes Maternal Grandmother     Breast cancer Maternal Aunt     Ovarian cancer Maternal Aunt     Heart disease Neg Hx        Social History  Social History     Socioeconomic History    Marital status:    Occupational History    Occupation:    Tobacco Use    Smoking status: Never    Smokeless tobacco: Never   Substance and Sexual Activity    Alcohol use: No    Drug use: No   Social History Narrative    Lives with wife. Two cats. 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         Review of Systems  14-point review of systems as follows:   No check rishi indicates NEGATIVE response   Constitutional: [] weight loss, [] change to appetite   Eyes: [] change in vision, [] double vision   Ears, nose, mouth, throat: [] frequent nose bleeds, [] ringing in the ears   Respiratory: [] cough, [] wheezing   Cardiovascular: [] chest pain, [] palpitations    Gastrointestinal: [] jaundice, [] nausea/vomiting   Genitourinary: [] incontinence, [] burning with urination   Hematologic/lymphatic: [] easy bruising/bleeding, [] night sweats   Neurological: [] numbness, [] weakness   Endocrine: [] fatigue, [] heat/cold intolerance   Allergy/Immunologic: [] fevers, [] chills   Musculoskeletal: [] muscle pain, [] joint pain   Psychiatric: [] thoughts of harming self/others, [] depression   Integumentary: [] rashes, [] sores that do not heal     Objective:        Vitals:    08/16/23 1104   BP: 127/86   Pulse: 70         Body mass index is 31.83 kg/m².    8/16/23  Constitutional:   He  appears well-developed and well-nourished. He is well groomed     Neurological Exam:  General: well-developed, well-nourished, no distress  Mental status: Awake and alert  Speech language: No dysarthria or aphasia on conversation  Cranial nerves: Face symmetric  Motor: Moves all extremities well  Coordination: No ataxia. No tremor.    3/14/23  Constitutional: appears in no acute distress, well-developed, well-nourished     Eyes: normal conjunctiva, PERRLA    Ears, nose, mouth, throat: external appearance of ears and nose normal, hearing intact, tongue scalloping      Cardiovascular: regular rate and rhythm, no murmurs appreciated    Respiratory: unlabored respirations, breath sounds normal bilaterally    Gastrointestinal: no visible abdominal masses, no guarding, no visible hernia    Musculoskeletal: normal tone in all four extremities. No abnormal movements. No pronator drift. No orbit. Symmetric finger tapping. Normal station. Normal regular gait. Normal tandem gait.      Spine:   CERVICAL SPINE:  ROM: normal   MUSCLE SPASM: bilateral    FACET LOADING: no   SPURLING: no  NORIS / HERNANDEZ tender: no     Psychiatric: normal judgment and insight. Oriented to person, place, and time.     Neurologic:   Cortical functions: recent and remote memory intact, normal attention span and concentration, speech fluent,  adequate fund of knowledge   Cranial nerves: visual fields full, PERRLA, EOMI, symmetric facial strength, hearing intact, palate elevates symmetrically, shoulder shrug 5/5, tongue protrudes midline   Reflexes: 2+ in the upper and lower extremities, no Montiel  Sensation: intact to temperature throughout   Coordination: normal finger to nose, heel to shin  Data Review:     I have personally reviewed the referring provider's notes, labs, & imaging made available to me today.      RADIOLOGY STUDIES:  I have personally reviewed the pertinent images performed.       Results for orders placed or performed during the hospital encounter of 06/21/19   CT Head Without Contrast    Narrative    EXAMINATION:  CT HEAD WITHOUT CONTRAST    CLINICAL HISTORY:  Headache, positional; headache,right side weakness    TECHNIQUE:  Low dose axial images were obtained through the head.  Coronal and sagittal reformations were also performed. Contrast was not administered.    Dose (DLP): 550 mGycm    COMPARISON:  None.    FINDINGS:  INTRACRANIAL: Brain parenchyma demonstrates normal attenuation and configuration.  No acute intracranial hemorrhage.  No hydrocephalus.  No intracranial mass effect.    SINUSES: Visualized paranasal sinuses and mastoids are clear.    SKULL/SCALP: Visualized osseous structures are normal.    ORBITS: Visualized orbits are normal.      Impression    No acute intracranial abnormality.      Electronically signed by: Sha Tubbs  Date:    06/21/2019  Time:    10:39       Lab Results   Component Value Date     11/10/2022    K 4.5 11/10/2022     11/10/2022    CO2 26 11/10/2022    BUN 8 11/10/2022    CREATININE 1.0 11/10/2022    GLU 91 11/10/2022    HGBA1C 5.2 07/15/2010    AST 20 11/10/2022    ALT 34 11/10/2022    ALBUMIN 4.4 11/10/2022    PROT 8.5 (H) 11/10/2022    BILITOT 0.7 11/10/2022    CHOL 185 11/02/2022    HDL 36 (L) 11/02/2022    LDLCALC 123.8 11/02/2022    TRIG 126 11/02/2022       Lab Results    Component Value Date    WBC 5.46 11/10/2022    HGB 15.7 11/10/2022    HCT 45.7 11/10/2022    MCV 85 11/10/2022     11/10/2022       Lab Results   Component Value Date    TSH 1.134 12/13/2011           Assessment & Plan:       Problem List Items Addressed This Visit          Neuro    Migraine without aura and without status migrainosus, not intractable - Primary    Overview     Migraine headaches since at least high school with strong family history. Headaches are typically unilateral, moderate to severe in intensity, worsen with activity, pounding in quality. Gradual progression pattern, lack of red flag features on history, and normal neurological exam are reassuring for primary as opposed to secondary etiology of headaches thus imaging will not be pursued for this history and this exam at this time.     He does have a strong dizziness component associated with his headaches leading to believe these may be vestibular migraines. He has had improved symptoms with PT. Only has dizziness with certain position changes.     Add triptan for acute attacks.            Current Assessment & Plan     PT helped but once he stopped headaches returned shortly after. Recommend he resume home PT exercises and PT. He is not ready to start daily preventative Rx         Relevant Medications    sumatriptan (IMITREX) 100 MG tablet               Please call our clinic at [phone] or send a message on the [email or web address] portal if there are any changes to the plan described below, for example,if you are not contacted for the requested tests, referral(s) within one week, if you are unable to receive the medications prescribed, or if you feel you need to change the treatment course for any reason.     TESTING:  -- none at this time    REFERRALS:  -- none     PREVENTION (use daily regardless of headache):  -- start magnesium in ONE of the following preparations -               1. Magnesium oxide 800mg daily (the most common over the  counter kind, may causes loose stools)              2. Magnesium citrate 400-500mg daily (harder to find, but more neutral on the bowels)              3. Magnesium glycinate 400mg daily (hardest to find, look online, but most bowel-neutral, best absorbed)     AS-NEEDED TREATMENT (use total no more than 10 days per month unless otherwise stated):  -- START sumatriptan with next migraine attack (escalation of headache). You can repeat two hours later if needed  -- STOP rizatriptan. If you have the same reaction to sumatriptan that you did to rizatriptan, message me and will we try a different medication class   -- continue tizanidine at night. This is a muscle relaxer and it will also make you potentially sleepy. Start with half a tablet to see how you respond but can take up to a whole tablet if needed      Follow up in about 4 months (around 12/16/2023).    Tea Alberts, NP

## 2023-08-16 NOTE — PATIENT INSTRUCTIONS
Please call our clinic at [phone] or send a message on the [facility] portal if there are any changes to the plan described below, for example,if you are not contacted for the requested tests, referral(s) within one week, if you are unable to receive the medications prescribed, or if you feel you need to change the treatment course for any reason.     TESTING:  -- none at this time    REFERRALS:  -- none     PREVENTION (use daily regardless of headache):  -- start magnesium in ONE of the following preparations -               1. Magnesium oxide 800mg daily (the most common over the counter kind, may causes loose stools)              2. Magnesium citrate 400-500mg daily (harder to find, but more neutral on the bowels)              3. Magnesium glycinate 400mg daily (hardest to find, look online, but most bowel-neutral, best absorbed)     AS-NEEDED TREATMENT (use total no more than 10 days per month unless otherwise stated):  -- START sumatriptan with next migraine attack (escalation of headache). You can repeat two hours later if needed  -- STOP rizatriptan. If you have the same reaction to sumatriptan that you did to rizatriptan, message me and will we try a different medication class   -- continue tizanidine at night. This is a muscle relaxer and it will also make you potentially sleepy. Start with half a tablet to see how you respond but can take up to a whole tablet if needed

## 2023-08-16 NOTE — ASSESSMENT & PLAN NOTE
PT helped but once he stopped headaches returned shortly after. Recommend he resume home PT exercises and PT. He is not ready to start daily preventative Rx

## 2024-06-26 ENCOUNTER — LAB VISIT (OUTPATIENT)
Dept: LAB | Facility: HOSPITAL | Age: 39
End: 2024-06-26
Attending: STUDENT IN AN ORGANIZED HEALTH CARE EDUCATION/TRAINING PROGRAM
Payer: COMMERCIAL

## 2024-06-26 ENCOUNTER — OFFICE VISIT (OUTPATIENT)
Dept: FAMILY MEDICINE | Facility: CLINIC | Age: 39
End: 2024-06-26
Payer: COMMERCIAL

## 2024-06-26 VITALS
SYSTOLIC BLOOD PRESSURE: 130 MMHG | HEART RATE: 73 BPM | WEIGHT: 222.88 LBS | OXYGEN SATURATION: 97 % | BODY MASS INDEX: 30.19 KG/M2 | HEIGHT: 72 IN | DIASTOLIC BLOOD PRESSURE: 84 MMHG

## 2024-06-26 DIAGNOSIS — J30.2 SEASONAL ALLERGIES: ICD-10-CM

## 2024-06-26 DIAGNOSIS — F41.9 ANXIETY: ICD-10-CM

## 2024-06-26 DIAGNOSIS — Z00.00 HEALTHCARE MAINTENANCE: Primary | ICD-10-CM

## 2024-06-26 DIAGNOSIS — Z00.00 HEALTHCARE MAINTENANCE: ICD-10-CM

## 2024-06-26 DIAGNOSIS — R07.89 CHEST TIGHTNESS: ICD-10-CM

## 2024-06-26 DIAGNOSIS — R10.13 EPIGASTRIC PAIN: ICD-10-CM

## 2024-06-26 LAB
ALBUMIN SERPL BCP-MCNC: 4 G/DL (ref 3.5–5.2)
ALP SERPL-CCNC: 91 U/L (ref 55–135)
ALT SERPL W/O P-5'-P-CCNC: 34 U/L (ref 10–44)
ANION GAP SERPL CALC-SCNC: 10 MMOL/L (ref 8–16)
AST SERPL-CCNC: 19 U/L (ref 10–40)
BILIRUB SERPL-MCNC: 0.4 MG/DL (ref 0.1–1)
BUN SERPL-MCNC: 13 MG/DL (ref 6–20)
CALCIUM SERPL-MCNC: 9.4 MG/DL (ref 8.7–10.5)
CHLORIDE SERPL-SCNC: 107 MMOL/L (ref 95–110)
CO2 SERPL-SCNC: 26 MMOL/L (ref 23–29)
CREAT SERPL-MCNC: 0.8 MG/DL (ref 0.5–1.4)
EST. GFR  (NO RACE VARIABLE): >60 ML/MIN/1.73 M^2
ESTIMATED AVG GLUCOSE: 100 MG/DL (ref 68–131)
GLUCOSE SERPL-MCNC: 90 MG/DL (ref 70–110)
HBA1C MFR BLD: 5.1 % (ref 4–5.6)
OHS QRS DURATION: 80 MS
OHS QTC CALCULATION: 403 MS
POTASSIUM SERPL-SCNC: 3.8 MMOL/L (ref 3.5–5.1)
PROT SERPL-MCNC: 7.6 G/DL (ref 6–8.4)
SODIUM SERPL-SCNC: 143 MMOL/L (ref 136–145)
TSH SERPL DL<=0.005 MIU/L-ACNC: 0.61 UIU/ML (ref 0.4–4)

## 2024-06-26 PROCEDURE — 99214 OFFICE O/P EST MOD 30 MIN: CPT | Mod: S$GLB,,, | Performed by: STUDENT IN AN ORGANIZED HEALTH CARE EDUCATION/TRAINING PROGRAM

## 2024-06-26 PROCEDURE — 36415 COLL VENOUS BLD VENIPUNCTURE: CPT | Mod: PO | Performed by: STUDENT IN AN ORGANIZED HEALTH CARE EDUCATION/TRAINING PROGRAM

## 2024-06-26 PROCEDURE — 93010 ELECTROCARDIOGRAM REPORT: CPT | Mod: S$GLB,,, | Performed by: INTERNAL MEDICINE

## 2024-06-26 PROCEDURE — 84443 ASSAY THYROID STIM HORMONE: CPT | Performed by: STUDENT IN AN ORGANIZED HEALTH CARE EDUCATION/TRAINING PROGRAM

## 2024-06-26 PROCEDURE — 3079F DIAST BP 80-89 MM HG: CPT | Mod: CPTII,S$GLB,, | Performed by: STUDENT IN AN ORGANIZED HEALTH CARE EDUCATION/TRAINING PROGRAM

## 2024-06-26 PROCEDURE — 1159F MED LIST DOCD IN RCRD: CPT | Mod: CPTII,S$GLB,, | Performed by: STUDENT IN AN ORGANIZED HEALTH CARE EDUCATION/TRAINING PROGRAM

## 2024-06-26 PROCEDURE — 99999 PR PBB SHADOW E&M-EST. PATIENT-LVL IV: CPT | Mod: PBBFAC,,, | Performed by: STUDENT IN AN ORGANIZED HEALTH CARE EDUCATION/TRAINING PROGRAM

## 2024-06-26 PROCEDURE — 3008F BODY MASS INDEX DOCD: CPT | Mod: CPTII,S$GLB,, | Performed by: STUDENT IN AN ORGANIZED HEALTH CARE EDUCATION/TRAINING PROGRAM

## 2024-06-26 PROCEDURE — 83036 HEMOGLOBIN GLYCOSYLATED A1C: CPT | Performed by: STUDENT IN AN ORGANIZED HEALTH CARE EDUCATION/TRAINING PROGRAM

## 2024-06-26 PROCEDURE — 3075F SYST BP GE 130 - 139MM HG: CPT | Mod: CPTII,S$GLB,, | Performed by: STUDENT IN AN ORGANIZED HEALTH CARE EDUCATION/TRAINING PROGRAM

## 2024-06-26 PROCEDURE — 80053 COMPREHEN METABOLIC PANEL: CPT | Mod: PO | Performed by: STUDENT IN AN ORGANIZED HEALTH CARE EDUCATION/TRAINING PROGRAM

## 2024-06-26 RX ORDER — FLUTICASONE PROPIONATE 50 MCG
1 SPRAY, SUSPENSION (ML) NASAL DAILY
COMMUNITY
End: 2024-06-26 | Stop reason: SDUPTHER

## 2024-06-26 RX ORDER — AZELASTINE 1 MG/ML
1 SPRAY, METERED NASAL 2 TIMES DAILY
Qty: 30 ML | Refills: 11 | Status: SHIPPED | OUTPATIENT
Start: 2024-06-26 | End: 2025-06-26

## 2024-06-26 RX ORDER — FLUTICASONE PROPIONATE 50 MCG
1 SPRAY, SUSPENSION (ML) NASAL DAILY
Qty: 18.2 ML | Refills: 11 | Status: SHIPPED | OUTPATIENT
Start: 2024-06-26

## 2024-06-26 NOTE — PROGRESS NOTES
"Name: Brian Gonzalez  MRN: [id number]  : 1985    Subjective   HPI  Patient presents with several concerns. He mentions prolonged issues with epigastric pain associated with nausea that seems to worsen with food intake. Prior imaging (MRI, US, CT) showed hepatic hemangioma, otherwise normal findings.    He also mentions bouts of chest tightness as below.     Review of Systems   Respiratory:  Positive for chest tightness (sporadic periods when this occurs, "like someone is reaching in and squeezing my heart" - associated with cough and seems to happen with stress).    Cardiovascular:  Negative for palpitations and leg swelling.   Gastrointestinal:  Positive for abdominal pain and nausea.   Neurological:  Negative for dizziness.        Patient Active Problem List   Diagnosis    Anxiety    Need for vaccination    Dizziness    Seasonal allergies    Acute midline thoracic back pain    Vestibular migraine    Migraine without aura and without status migrainosus, not intractable    Neck pain    Decreased ROM of intervertebral discs of cervical spine    Dizzy spells       Current Outpatient Medications on File Prior to Visit   Medication Sig Dispense Refill    cetirizine (ZYRTEC) 5 MG tablet Take 5 mg by mouth daily as needed.      [DISCONTINUED] azelastine (ASTELIN) 137 mcg (0.1 %) nasal spray 1 spray (137 mcg total) by Nasal route 2 (two) times daily. 30 mL 11    [DISCONTINUED] fluticasone propionate (FLONASE) 50 mcg/actuation nasal spray 1 spray by Each Nostril route once daily.      [DISCONTINUED] pantoprazole (PROTONIX) 40 MG tablet Take 1 tablet (40 mg total) by mouth once daily. (Patient not taking: Reported on 3/14/2023) 14 tablet 0    [DISCONTINUED] sumatriptan (IMITREX) 100 MG tablet 1 tab PO PRN migraine. May repeat once in 2 hours, no more than 2 tab in 24 hours. Use no more than 10 days per month. (Patient not taking: Reported on 2024) 10 tablet 11    [DISCONTINUED] tiZANidine (ZANAFLEX) 4 MG " tablet Half or full tablet by mouth at night as needed for muscle spasm (Patient not taking: Reported on 6/26/2024) 30 tablet 11    [DISCONTINUED] zonisamide (ZONEGRAN) 100 MG Cap Take 1 capsule (100 mg total) by mouth every evening. (Patient not taking: Reported on 5/9/2023) 30 capsule 11     No current facility-administered medications on file prior to visit.       Past Medical History:   Diagnosis Date    Anxiety     Headache        Past Surgical History:   Procedure Laterality Date    FOREARM FRACTURE SURGERY Left 2000    FOREARM SURGERY Right 1995    Hook removal        Family History   Problem Relation Name Age of Onset    Breast cancer Mother  50    Diabetes Maternal Grandmother      Breast cancer Maternal Aunt      Ovarian cancer Maternal Aunt      Heart disease Neg Hx         Social History     Socioeconomic History    Marital status:    Occupational History    Occupation:    Tobacco Use    Smoking status: Never    Smokeless tobacco: Never   Substance and Sexual Activity    Alcohol use: No    Drug use: No    Sexual activity: Yes     Partners: Female     Birth control/protection: Condom   Social History Narrative    Lives with wife. Two cats. 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        Review of patient's allergies indicates:  No Known Allergies     Health Maintenance Due   Topic Date Due    Pneumococcal Vaccines (Age 0-64) (1 of 2 - PCV) Never done    Hemoglobin A1c (Diabetic Prevention Screening)  09/19/2022    COVID-19 Vaccine (5 - 2023-24 season) 09/01/2023       Objective   Vitals:    06/26/24 0957   BP: 130/84   Pulse:         Physical Exam  Constitutional:       General: He is not in acute distress.     Appearance: Normal appearance. He is well-developed.   HENT:      Head: Normocephalic and atraumatic.      Right Ear: External ear normal.      Left Ear: External ear normal.   Eyes:      Conjunctiva/sclera: Conjunctivae normal.   Cardiovascular:      Rate and Rhythm: Normal  rate and regular rhythm.      Heart sounds: No murmur heard.     No friction rub. No gallop.   Pulmonary:      Effort: Pulmonary effort is normal. No respiratory distress.      Breath sounds: No wheezing, rhonchi or rales.   Abdominal:      General: Abdomen is flat. Bowel sounds are normal. There is no distension.      Tenderness: There is no abdominal tenderness.   Musculoskeletal:         General: No swelling or deformity.      Right lower leg: No edema.      Left lower leg: No edema.   Skin:     General: Skin is warm and dry.      Coloration: Skin is not jaundiced.   Neurological:      Mental Status: He is alert and oriented to person, place, and time. Mental status is at baseline.   Psychiatric:         Attention and Perception: Attention and perception normal.         Mood and Affect: Mood normal.         Speech: Speech normal.         Behavior: Behavior normal. Behavior is cooperative.         Thought Content: Thought content normal.         Cognition and Memory: Cognition normal.         Judgment: Judgment normal.          Assessment & Plan   1. Healthcare maintenance  -     Comprehensive Metabolic Panel; Future; Expected date: 06/26/2024  -     Hemoglobin A1C; Future; Expected date: 06/26/2024    2. Chest tightness  -     IN OFFICE EKG 12-LEAD (to [facility])  -     TSH; Future; Expected date: 06/26/2024    3. Epigastric pain  -     H. pylori antigen, stool; Future; Expected date: 06/26/2024    4. Anxiety  -     TSH; Future; Expected date: 06/26/2024    5. Seasonal allergies  -     fluticasone propionate (FLONASE) 50 mcg/actuation nasal spray; 1 spray (50 mcg total) by Each Nostril route once daily.  Dispense: 18.2 mL; Refill: 11  -     azelastine (ASTELIN) 137 mcg (0.1 %) nasal spray; 1 spray (137 mcg total) by Nasal route 2 (two) times daily.  Dispense: 30 mL; Refill: 11    EKG was normal. Tightness could be related to anxiety or reflux with esophageal spasms. I will concentrate on reflux/epigastric pain workup.  If H pylori testing is negative, consider EGD. If gastric workup is negative, consider cardiac event monitor.    Follow up in 4-5 months.     Ronaldo Gutierrez MD  06/26/2024

## 2024-07-05 ENCOUNTER — PATIENT MESSAGE (OUTPATIENT)
Dept: FAMILY MEDICINE | Facility: CLINIC | Age: 39
End: 2024-07-05
Payer: COMMERCIAL

## 2024-07-10 ENCOUNTER — LAB VISIT (OUTPATIENT)
Dept: LAB | Facility: HOSPITAL | Age: 39
End: 2024-07-10
Attending: STUDENT IN AN ORGANIZED HEALTH CARE EDUCATION/TRAINING PROGRAM
Payer: COMMERCIAL

## 2024-07-10 DIAGNOSIS — R10.13 EPIGASTRIC PAIN: ICD-10-CM

## 2024-07-10 PROCEDURE — 87338 HPYLORI STOOL AG IA: CPT | Performed by: STUDENT IN AN ORGANIZED HEALTH CARE EDUCATION/TRAINING PROGRAM

## 2024-07-18 LAB — H PYLORI AG STL QL IA: NOT DETECTED

## 2024-07-19 DIAGNOSIS — R10.13 EPIGASTRIC PAIN: Primary | ICD-10-CM

## 2024-07-22 ENCOUNTER — TELEPHONE (OUTPATIENT)
Dept: GASTROENTEROLOGY | Facility: CLINIC | Age: 39
End: 2024-07-22
Payer: COMMERCIAL

## 2024-07-22 NOTE — TELEPHONE ENCOUNTER
Spoke to pt in regards to scheduling ordered EGD. Pt refused scheduling at this time stating he wanted to think about it for awhile. Pt advised to contact clinic when ready to schedule.

## 2024-08-27 ENCOUNTER — PATIENT MESSAGE (OUTPATIENT)
Dept: FAMILY MEDICINE | Facility: CLINIC | Age: 39
End: 2024-08-27
Payer: COMMERCIAL

## 2024-11-13 ENCOUNTER — TELEPHONE (OUTPATIENT)
Dept: FAMILY MEDICINE | Facility: CLINIC | Age: 39
End: 2024-11-13
Payer: COMMERCIAL

## 2024-11-13 NOTE — TELEPHONE ENCOUNTER
----- Message from Alissa sent at 11/13/2024 12:23 PM CST -----  Contact: self    ----- Message -----  From: Hillary So RN  Sent: 11/13/2024  11:41 AM CST  To: Alissa Vasques    Wrong provider  ----- Message -----  From: Alissa Vasques  Sent: 11/13/2024  10:59 AM CST  To: Brenda FERRARI Staff    Type:  Patient Returning Call    Who Called:  the patient  Who Left Message for Patient:  Lulu  Does the patient know what this is regarding?:  yes  Best Call Back Number:  [phone]

## 2024-11-13 NOTE — TELEPHONE ENCOUNTER
Tried to call pt. No answer. Left message to return call   -- will not be in clinic today. Pt needs to manolo.

## 2024-12-17 ENCOUNTER — OFFICE VISIT (OUTPATIENT)
Dept: FAMILY MEDICINE | Facility: CLINIC | Age: 39
End: 2024-12-17
Payer: COMMERCIAL

## 2024-12-17 VITALS
SYSTOLIC BLOOD PRESSURE: 124 MMHG | DIASTOLIC BLOOD PRESSURE: 76 MMHG | HEART RATE: 60 BPM | BODY MASS INDEX: 30.11 KG/M2 | WEIGHT: 222.31 LBS | OXYGEN SATURATION: 95 % | HEIGHT: 72 IN

## 2024-12-17 DIAGNOSIS — K21.9 GASTROESOPHAGEAL REFLUX DISEASE, UNSPECIFIED WHETHER ESOPHAGITIS PRESENT: Primary | ICD-10-CM

## 2024-12-17 PROCEDURE — 99999 PR PBB SHADOW E&M-EST. PATIENT-LVL III: CPT | Mod: PBBFAC,,, | Performed by: STUDENT IN AN ORGANIZED HEALTH CARE EDUCATION/TRAINING PROGRAM

## 2024-12-17 PROCEDURE — 3078F DIAST BP <80 MM HG: CPT | Mod: CPTII,S$GLB,, | Performed by: STUDENT IN AN ORGANIZED HEALTH CARE EDUCATION/TRAINING PROGRAM

## 2024-12-17 PROCEDURE — 1159F MED LIST DOCD IN RCRD: CPT | Mod: CPTII,S$GLB,, | Performed by: STUDENT IN AN ORGANIZED HEALTH CARE EDUCATION/TRAINING PROGRAM

## 2024-12-17 PROCEDURE — 99213 OFFICE O/P EST LOW 20 MIN: CPT | Mod: S$GLB,,, | Performed by: STUDENT IN AN ORGANIZED HEALTH CARE EDUCATION/TRAINING PROGRAM

## 2024-12-17 PROCEDURE — 3044F HG A1C LEVEL LT 7.0%: CPT | Mod: CPTII,S$GLB,, | Performed by: STUDENT IN AN ORGANIZED HEALTH CARE EDUCATION/TRAINING PROGRAM

## 2024-12-17 PROCEDURE — 3074F SYST BP LT 130 MM HG: CPT | Mod: CPTII,S$GLB,, | Performed by: STUDENT IN AN ORGANIZED HEALTH CARE EDUCATION/TRAINING PROGRAM

## 2024-12-17 PROCEDURE — 3008F BODY MASS INDEX DOCD: CPT | Mod: CPTII,S$GLB,, | Performed by: STUDENT IN AN ORGANIZED HEALTH CARE EDUCATION/TRAINING PROGRAM

## 2024-12-17 RX ORDER — OMEPRAZOLE 40 MG/1
40 CAPSULE, DELAYED RELEASE ORAL DAILY
Qty: 90 CAPSULE | Refills: 3 | Status: SHIPPED | OUTPATIENT
Start: 2024-12-17 | End: 2025-12-17

## 2024-12-17 NOTE — PROGRESS NOTES
Name: Brian Gonzalez  MRN: [id number]  : 1985    Subjective   HPI  Patient presents for follow up of chest pains. ECG from last visit was normal. H Pylori stool testing was normal. I had not yet sent a course of PPI after the test returned.     Review of Systems   Respiratory:  Positive for chest tightness.    Cardiovascular:  Positive for chest pain (reflux). Negative for palpitations and leg swelling.   Neurological:  Negative for dizziness.        Patient Active Problem List   Diagnosis    Anxiety    Need for vaccination    Dizziness    Seasonal allergies    Acute midline thoracic back pain    Vestibular migraine    Migraine without aura and without status migrainosus, not intractable    Neck pain    Decreased ROM of intervertebral discs of cervical spine    Dizzy spells    Gastroesophageal reflux disease       Current Outpatient Medications on File Prior to Visit   Medication Sig Dispense Refill    azelastine (ASTELIN) 137 mcg (0.1 %) nasal spray 1 spray (137 mcg total) by Nasal route 2 (two) times daily. 30 mL 11    cetirizine (ZYRTEC) 5 MG tablet Take 5 mg by mouth daily as needed.      fluticasone propionate (FLONASE) 50 mcg/actuation nasal spray 1 spray (50 mcg total) by Each Nostril route once daily. 18.2 mL 11     No current facility-administered medications on file prior to visit.       Past Medical History:   Diagnosis Date    Anxiety     Headache        Past Surgical History:   Procedure Laterality Date    FOREARM FRACTURE SURGERY Left     FOREARM SURGERY Right     Hook removal        Family History   Problem Relation Name Age of Onset    Breast cancer Mother  50    Diabetes Maternal Grandmother      Breast cancer Maternal Aunt      Ovarian cancer Maternal Aunt      Heart disease Neg Hx         Social History     Socioeconomic History    Marital status:    Occupational History    Occupation:    Tobacco Use    Smoking status: Never    Smokeless tobacco: Never    Substance and Sexual Activity    Alcohol use: No    Drug use: No    Sexual activity: Yes     Partners: Female     Birth control/protection: Condom   Social History Narrative    Lives with wife. Two cats. 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        Review of patient's allergies indicates:  No Known Allergies     Health Maintenance Due   Topic Date Due    Pneumococcal Vaccines (Age 0-64) (1 of 2 - PCV) Never done    Influenza Vaccine (1) 09/01/2024    COVID-19 Vaccine (5 - 2024-25 season) 09/01/2024       Objective   Vitals:    12/17/24 1028   BP: 124/76   Pulse: 60        Physical Exam  Constitutional:       General: He is not in acute distress.     Appearance: Normal appearance. He is well-developed.   HENT:      Head: Normocephalic and atraumatic.      Right Ear: External ear normal.      Left Ear: External ear normal.   Eyes:      Conjunctiva/sclera: Conjunctivae normal.   Pulmonary:      Effort: Pulmonary effort is normal. No respiratory distress.   Abdominal:      General: Abdomen is flat. There is no distension.   Musculoskeletal:         General: No swelling or deformity.      Right lower leg: No edema.      Left lower leg: No edema.   Skin:     General: Skin is warm and dry.      Coloration: Skin is not jaundiced.   Neurological:      Mental Status: He is alert and oriented to person, place, and time. Mental status is at baseline.   Psychiatric:         Attention and Perception: Attention and perception normal.         Mood and Affect: Mood normal.         Speech: Speech normal.         Behavior: Behavior normal. Behavior is cooperative.         Thought Content: Thought content normal.         Cognition and Memory: Cognition normal.         Judgment: Judgment normal.          Assessment & Plan   1. Gastroesophageal reflux disease, unspecified whether esophagitis present  Assessment & Plan:  Chest tightness/pain may be esophageal spasms, as previously discussed. No concern for any cardiac pathology. Start  omeprazole. Will discuss taper at next visit.     Orders:  -     omeprazole (PRILOSEC) 40 MG capsule; Take 1 capsule (40 mg total) by mouth once daily.  Dispense: 90 capsule; Refill: 3        Follow up in 4 months.     Ronaldo Gutierrez MD  12/19/2024

## 2024-12-19 PROBLEM — K21.9 GASTROESOPHAGEAL REFLUX DISEASE: Status: ACTIVE | Noted: 2024-12-19

## 2024-12-19 NOTE — ASSESSMENT & PLAN NOTE
Chest tightness/pain may be esophageal spasms, as previously discussed. No concern for any cardiac pathology. Start omeprazole. Will discuss taper at next visit.

## 2025-01-21 ENCOUNTER — OFFICE VISIT (OUTPATIENT)
Dept: FAMILY MEDICINE | Facility: CLINIC | Age: 40
End: 2025-01-21
Payer: COMMERCIAL

## 2025-01-21 ENCOUNTER — PATIENT MESSAGE (OUTPATIENT)
Dept: FAMILY MEDICINE | Facility: CLINIC | Age: 40
End: 2025-01-21
Payer: COMMERCIAL

## 2025-01-21 ENCOUNTER — TELEPHONE (OUTPATIENT)
Dept: FAMILY MEDICINE | Facility: CLINIC | Age: 40
End: 2025-01-21
Payer: COMMERCIAL

## 2025-01-21 DIAGNOSIS — R10.12 LEFT UPPER QUADRANT PAIN: ICD-10-CM

## 2025-01-21 DIAGNOSIS — G43.809 VESTIBULAR MIGRAINE: ICD-10-CM

## 2025-01-21 DIAGNOSIS — R91.1 SOLITARY PULMONARY NODULE: ICD-10-CM

## 2025-01-21 DIAGNOSIS — R10.12 LUQ PAIN: ICD-10-CM

## 2025-01-21 DIAGNOSIS — R91.1 PULMONARY NODULE, LEFT: Primary | ICD-10-CM

## 2025-01-21 DIAGNOSIS — G43.009 MIGRAINE WITHOUT AURA AND WITHOUT STATUS MIGRAINOSUS, NOT INTRACTABLE: ICD-10-CM

## 2025-01-21 DIAGNOSIS — D18.03 HEMANGIOMA OF INTRA-ABDOMINAL STRUCTURE: ICD-10-CM

## 2025-01-21 NOTE — PROGRESS NOTES
The patient location is: [place], [place]  The chief complaint leading to consultation is: luq abd pain    Visit type: audiovisual    Notes:    Abdominal Pain  This is a new problem. The current episode started in the past 7 days. The onset quality is gradual. The problem occurs constantly. The most recent episode lasted 7 days. The problem has been unchanged. The pain is located in the LUQ. The pain is at a severity of 2/10. The patient is experiencing no pain. The quality of the pain is dull. The abdominal pain does not radiate. Associated symptoms include belching, flatus and frequency. Pertinent negatives include no anorexia, arthralgias, constipation, diarrhea, dysuria, fever, headaches, hematochezia, hematuria, melena, myalgias, nausea, vomiting or weight loss. The pain is aggravated by eating. The pain is relieved by Rec[employer]ency. He has tried acetaminophen for the symptoms. The treatment provided no relief. His past medical history is significant for GERD. There is no history of abdominal surgery, colon cancer, Crohn's disease, gallstones, irritable bowel syndrome, pancreatitis, PUD or ulcerative colitis. Patient's medical history does not include kidney stones and UTI.     Last bowel movement was 1-2 days ago. Denies constipation. Started omeprazole 1 week ago, before that he felt a dull pressure in abdominal area. Has had bad acid reflux issues. Started omeprazole 1-2 days after feeling the pressure. Been abt 1 week now on medication, and dull pain not improved. He reports he usually takes tylenol if any issues. No nausea, vomiting. Feels like something is pushing against inside. Better if he lays down. If he sits and hunches more pressure. Worsened the past 2 days.     He also describes some nonspecific facial discomfort that has occurred prior when he has anxiety.    Review of Systems   Constitutional:  Negative for fever and weight loss.   Gastrointestinal:  Positive for abdominal pain and flatus. Negative for  "anorexia, constipation, diarrhea, hematochezia, melena, nausea and vomiting.   Genitourinary:  Positive for frequency. Negative for dysuria and hematuria.   Musculoskeletal:  Negative for arthralgias and myalgias.   Neurological:  Negative for headaches.     11/2022: CT abd/pelvis "Left lower lobe 8 mm pulmonary nodule.  For a solid nodule 6-8 mm, Fleischner Society 2017 guidelines recommend follow up with non-contrast chest CT at 6-12 months and 18-24 months after discovery.     Hepatic dome 3.3 cm enhancing mass, which in the absence of a known malignancy or chronic liver disease is most likely benign such as a hemangioma.  However further evaluation could be obtained with a liver mass protocol MRI or CT as clinically indicated."    11/2022 MRI liver: "Impression:  Suggestion of flash filling hemangioma involving lesion in the dome liver in segment VII.  Recommend short-term follow-up imaging in 3 months to confirm stability."    2/28/2023 US liver: " Impression:     1. Grossly stable size lesion within the right hepatic lobe which is nonspecific by ultrasound but previously characterized as a potential hemangioma by MRI and CT.  No additional liver lesions appreciated.  No acute process.   "          Objective:  Physical Exam  Physical Exam  Constitutional:       Appearance: Normal appearance. Appears to not feel well.   Pulmonary:      Effort: Pulmonary effort is normal. No respiratory distress.   Neurological:      Mental Status: He is alert and oriented to person, place, and time.   Psychiatric:         Attention and Perception: Attention normal.         Mood and Affect: Mood normal. Mood is not anxious.         Behavior: Behavior normal.       Plan:  1. Pulmonary nodule, left  Comments:  Left lower lobe 8 mm pulmonary nodule.due for noncontrast ct  Orders:  -     CT Chest Without Contrast; Future; Expected date: 01/21/2025  -     CT Abdomen Pelvis  Without Contrast; Future; Expected date: 01/21/2025    2. " Hemangioma of intra-abdominal structure  Comments:  Hepatic dome 3.3 cm enhancing mass  Orders:  -     CT Abdomen Pelvis  Without Contrast; Future; Expected date: 01/21/2025    3. LUQ pain  -     CT Abdomen Pelvis  Without Contrast; Future; Expected date: 01/21/2025  -     Ambulatory referral/consult to Gastroenterology; Future; Expected date: 01/28/2025    4. Solitary pulmonary nodule  -     CT Chest Without Contrast; Future; Expected date: 01/21/2025    5. Left upper quadrant pain  -     CT Abdomen Pelvis  Without Contrast; Future; Expected date: 01/21/2025    6. Vestibular migraine  -discussed increasing water intake  -tylenol as needed  -discussed strokelike signs/sx  -call 911 if new or worsening symptoms    7. Migraine without aura and without status migrainosus, not intractable  Overview:  Migraine headaches since at least high school with strong family history. Headaches are typically unilateral, moderate to severe in intensity, worsen with activity, pounding in quality. Gradual progression pattern, lack of red flag features on history, and normal neurological exam are reassuring for primary as opposed to secondary etiology of headaches thus imaging will not be pursued for this history and this exam at this time.     He does have a strong dizziness component associated with his headaches leading to believe these may be vestibular migraines. He has had improved symptoms with PT. Only has dizziness with certain position changes.     Add triptan for acute attacks.             Follow up if symptoms worsen or fail to improve, for call 911 if new or worsening symptoms. follow up dr. hsu this week per scheduled appt. .      Face to Face time with patient: 23 minutes  30 minutes of total time spent on the encounter, which includes face to face time and non-face to face time preparing to see the patient (eg, review of tests), Obtaining and/or reviewing separately obtained history, Documenting clinical  information in the electronic or other health record, Independently interpreting results (not separately reported) and communicating results to the patient/family/caregiver, or Care coordination (not separately reported).     Each patient to whom he or she provides medical services by telemedicine is:  (1) informed of the relationship between the physician and patient and the respective role of any other health care provider with respect to management of the patient; and (2) notified that he or she may decline to receive medical services by telemedicine and may withdraw from such care at any time.

## 2025-01-23 ENCOUNTER — TELEPHONE (OUTPATIENT)
Dept: FAMILY MEDICINE | Facility: CLINIC | Age: 40
End: 2025-01-23
Payer: COMMERCIAL

## 2025-01-23 ENCOUNTER — PATIENT MESSAGE (OUTPATIENT)
Dept: FAMILY MEDICINE | Facility: CLINIC | Age: 40
End: 2025-01-23
Payer: COMMERCIAL

## 2025-01-24 ENCOUNTER — OFFICE VISIT (OUTPATIENT)
Dept: FAMILY MEDICINE | Facility: CLINIC | Age: 40
End: 2025-01-24
Payer: COMMERCIAL

## 2025-01-24 ENCOUNTER — HOSPITAL ENCOUNTER (OUTPATIENT)
Dept: RADIOLOGY | Facility: HOSPITAL | Age: 40
Discharge: HOME | End: 2025-01-24
Payer: COMMERCIAL

## 2025-01-24 VITALS
DIASTOLIC BLOOD PRESSURE: 78 MMHG | BODY MASS INDEX: 30.67 KG/M2 | SYSTOLIC BLOOD PRESSURE: 124 MMHG | HEIGHT: 72 IN | OXYGEN SATURATION: 97 % | WEIGHT: 226.44 LBS | HEART RATE: 81 BPM

## 2025-01-24 DIAGNOSIS — Z23 NEED FOR INFLUENZA VACCINATION: ICD-10-CM

## 2025-01-24 DIAGNOSIS — R91.1 PULMONARY NODULE, LEFT: ICD-10-CM

## 2025-01-24 DIAGNOSIS — R10.13 EPIGASTRIC PAIN: Primary | ICD-10-CM

## 2025-01-24 DIAGNOSIS — R10.12 LUQ PAIN: ICD-10-CM

## 2025-01-24 DIAGNOSIS — D18.03 HEMANGIOMA OF INTRA-ABDOMINAL STRUCTURE: ICD-10-CM

## 2025-01-24 DIAGNOSIS — R10.12 LEFT UPPER QUADRANT PAIN: ICD-10-CM

## 2025-01-24 PROCEDURE — 3074F SYST BP LT 130 MM HG: CPT | Mod: CPTII,S$GLB,, | Performed by: STUDENT IN AN ORGANIZED HEALTH CARE EDUCATION/TRAINING PROGRAM

## 2025-01-24 PROCEDURE — 99999 PR PBB SHADOW E&M-EST. PATIENT-LVL III: CPT | Mod: PBBFAC,,, | Performed by: STUDENT IN AN ORGANIZED HEALTH CARE EDUCATION/TRAINING PROGRAM

## 2025-01-24 PROCEDURE — 90471 IMMUNIZATION ADMIN: CPT | Mod: S$GLB,,, | Performed by: STUDENT IN AN ORGANIZED HEALTH CARE EDUCATION/TRAINING PROGRAM

## 2025-01-24 PROCEDURE — 1159F MED LIST DOCD IN RCRD: CPT | Mod: CPTII,S$GLB,, | Performed by: STUDENT IN AN ORGANIZED HEALTH CARE EDUCATION/TRAINING PROGRAM

## 2025-01-24 PROCEDURE — A9698 NON-RAD CONTRAST MATERIALNOC: HCPCS | Mod: PO

## 2025-01-24 PROCEDURE — 71250 CT THORAX DX C-: CPT | Mod: 26,,, | Performed by: STUDENT IN AN ORGANIZED HEALTH CARE EDUCATION/TRAINING PROGRAM

## 2025-01-24 PROCEDURE — 99213 OFFICE O/P EST LOW 20 MIN: CPT | Mod: 25,S$GLB,, | Performed by: STUDENT IN AN ORGANIZED HEALTH CARE EDUCATION/TRAINING PROGRAM

## 2025-01-24 PROCEDURE — 74176 CT ABD & PELVIS W/O CONTRAST: CPT | Mod: 26,,, | Performed by: STUDENT IN AN ORGANIZED HEALTH CARE EDUCATION/TRAINING PROGRAM

## 2025-01-24 PROCEDURE — 74176 CT ABD & PELVIS W/O CONTRAST: CPT | Mod: TC,PO

## 2025-01-24 PROCEDURE — 90656 IIV3 VACC NO PRSV 0.5 ML IM: CPT | Mod: S$GLB,,, | Performed by: STUDENT IN AN ORGANIZED HEALTH CARE EDUCATION/TRAINING PROGRAM

## 2025-01-24 PROCEDURE — 3078F DIAST BP <80 MM HG: CPT | Mod: CPTII,S$GLB,, | Performed by: STUDENT IN AN ORGANIZED HEALTH CARE EDUCATION/TRAINING PROGRAM

## 2025-01-24 PROCEDURE — 3008F BODY MASS INDEX DOCD: CPT | Mod: CPTII,S$GLB,, | Performed by: STUDENT IN AN ORGANIZED HEALTH CARE EDUCATION/TRAINING PROGRAM

## 2025-01-24 PROCEDURE — 25500020 PHARM REV CODE 255: Mod: PO

## 2025-01-24 RX ORDER — ESOMEPRAZOLE MAGNESIUM 40 MG/1
40 CAPSULE, DELAYED RELEASE ORAL
Qty: 84 CAPSULE | Refills: 0 | Status: SHIPPED | OUTPATIENT
Start: 2025-01-24 | End: 2025-03-07

## 2025-01-24 RX ADMIN — BARIUM SULFATE 900 ML: 20 SUSPENSION ORAL at 09:01

## 2025-01-24 NOTE — PROGRESS NOTES
Name: Brian Gonzalez  MRN: [id number]  : 1985    Subjective   HPI  Patient presents to follow up on abdominal pain. Low grade pressure in LUQ, worse with food. Didn't improve after a week of daily omeprazole. He talked to Dr. Peterson earlier this week and she ordered a CT chest. The CT showed no acute processes but did show stable hepatic hemangioma and stable pulmonary nodule.     Review of Systems   Respiratory:  Negative for cough.    Cardiovascular:  Negative for palpitations and leg swelling.   Gastrointestinal:  Positive for abdominal pain (LUQ/epigastric, worse with food). Negative for constipation, diarrhea and nausea.   Neurological:  Negative for dizziness.        Patient Active Problem List   Diagnosis    Anxiety    Need for vaccination    Dizziness    Seasonal allergies    Acute midline thoracic back pain    Vestibular migraine    Migraine without aura and without status migrainosus, not intractable    Neck pain    Decreased ROM of intervertebral discs of cervical spine    Dizzy spells    Gastroesophageal reflux disease       Current Outpatient Medications on File Prior to Visit   Medication Sig Dispense Refill    azelastine (ASTELIN) 137 mcg (0.1 %) nasal spray 1 spray (137 mcg total) by Nasal route 2 (two) times daily. 30 mL 11    cetirizine (ZYRTEC) 5 MG tablet Take 5 mg by mouth daily as needed.      fluticasone propionate (FLONASE) 50 mcg/actuation nasal spray 1 spray (50 mcg total) by Each Nostril route once daily. 18.2 mL 11    omeprazole (PRILOSEC) 40 MG capsule Take 1 capsule (40 mg total) by mouth once daily. 90 capsule 3     Current Facility-Administered Medications on File Prior to Visit   Medication Dose Route Frequency Provider Last Rate Last Admin    [COMPLETED] barium sulfate (READI-CAT) suspension 900 mL  900 mL Oral ONCE PRN Sofi Peterson MD   900 mL at 25 0916       Past Medical History:   Diagnosis Date    Anxiety     Headache        Past Surgical History:    Procedure Laterality Date    FOREARM FRACTURE SURGERY Left 2000    FOREARM SURGERY Right 1995    Hook removal        Family History   Problem Relation Name Age of Onset    Breast cancer Mother  50    Diabetes Maternal Grandmother      Breast cancer Maternal Aunt      Ovarian cancer Maternal Aunt      Heart disease Neg Hx         Social History     Socioeconomic History    Marital status:    Occupational History    Occupation:    Tobacco Use    Smoking status: Never    Smokeless tobacco: Never   Substance and Sexual Activity    Alcohol use: No    Drug use: No    Sexual activity: Yes     Partners: Female     Birth control/protection: Condom   Social History Narrative    Lives with wife. Two cats. 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      Social Drivers of Health     Financial Resource Strain: Low Risk  (1/21/2025)    Overall Financial Resource Strain (CARDIA)     Difficulty of Paying Living Expenses: Not very hard   Food Insecurity: Patient Declined (1/21/2025)    Hunger Vital Sign     Worried About Running Out of Food in the Last Year: Patient declined     Ran Out of Food in the Last Year: Patient declined   Physical Activity: Inactive (1/21/2025)    Exercise Vital Sign     Days of Exercise per Week: 0 days     Minutes of Exercise per Session: 0 min   Stress: Stress Concern Present (1/21/2025)    [employer] - Occupational Stress Questionnaire     Feeling of Stress : To some extent   Housing Stability: Unknown (1/21/2025)    Housing Stability Vital Sign     Unable to Pay for Housing in the Last Year: No       Review of patient's allergies indicates:  No Known Allergies     Health Maintenance Due   Topic Date Due    Pneumococcal Vaccines (Age 0-49) (1 of 2 - PCV) Never done    Influenza Vaccine (1) 09/01/2024    COVID-19 Vaccine (5 - 2024-25 season) 09/01/2024       Objective   Vitals:    01/24/25 1042   BP: 124/78   Pulse: 81        Physical Exam  Constitutional:        General: He is not in acute distress.     Appearance: Normal appearance. He is well-developed.   HENT:      Head: Normocephalic and atraumatic.      Right Ear: External ear normal.      Left Ear: External ear normal.   Eyes:      Conjunctiva/sclera: Conjunctivae normal.   Pulmonary:      Effort: Pulmonary effort is normal. No respiratory distress.   Abdominal:      General: Abdomen is flat. Bowel sounds are normal. There is no distension.      Tenderness: There is no abdominal tenderness.   Musculoskeletal:         General: No swelling or deformity.      Right lower leg: No edema.      Left lower leg: No edema.   Skin:     General: Skin is warm and dry.      Coloration: Skin is not jaundiced.   Neurological:      Mental Status: He is alert and oriented to person, place, and time. Mental status is at baseline.   Psychiatric:         Attention and Perception: Attention and perception normal.         Mood and Affect: Mood normal.         Speech: Speech normal.         Behavior: Behavior normal. Behavior is cooperative.         Thought Content: Thought content normal.         Cognition and Memory: Cognition normal.         Judgment: Judgment normal.          Assessment & Plan   1. Epigastric pain  -     esomeprazole (NEXIUM) 40 MG capsule; Take 1 capsule (40 mg total) by mouth 2 (two) times daily before meals.  Dispense: 84 capsule; Refill: 0    2. Need for influenza vaccination  -     influenza (Flulaval, Fluzone, Fluarix) 45 mcg/0.5 mL IM vaccine (> or = 6 mo) 0.5 mL    I believe this is dyspepsia or gastric ulcer. Medication change as listed. He has appointment with gastroenterology next week - given his history of epigastric pain and GERD, I think this is appropriate.    Follow up as previously scheduled.     Ronaldo Gutierrez MD  01/24/2025

## 2025-01-28 ENCOUNTER — OFFICE VISIT (OUTPATIENT)
Dept: GASTROENTEROLOGY | Facility: CLINIC | Age: 40
End: 2025-01-28
Payer: COMMERCIAL

## 2025-01-28 VITALS — WEIGHT: 225.06 LBS | HEIGHT: 72 IN | BODY MASS INDEX: 30.48 KG/M2

## 2025-01-28 DIAGNOSIS — R14.2 BELCHING: ICD-10-CM

## 2025-01-28 DIAGNOSIS — R10.12 LUQ PAIN: Primary | ICD-10-CM

## 2025-01-28 DIAGNOSIS — K21.9 GASTROESOPHAGEAL REFLUX DISEASE, UNSPECIFIED WHETHER ESOPHAGITIS PRESENT: ICD-10-CM

## 2025-01-28 PROCEDURE — 3008F BODY MASS INDEX DOCD: CPT | Mod: CPTII,S$GLB,,

## 2025-01-28 PROCEDURE — 99203 OFFICE O/P NEW LOW 30 MIN: CPT | Mod: S$GLB,,,

## 2025-01-28 PROCEDURE — 1160F RVW MEDS BY RX/DR IN RCRD: CPT | Mod: CPTII,S$GLB,,

## 2025-01-28 PROCEDURE — 1159F MED LIST DOCD IN RCRD: CPT | Mod: CPTII,S$GLB,,

## 2025-01-28 PROCEDURE — 99999 PR PBB SHADOW E&M-EST. PATIENT-LVL IV: CPT | Mod: PBBFAC,,,

## 2025-01-28 NOTE — PROGRESS NOTES
Subjective:       Patient ID: Brian Gonzalez is a 39 y.o. male Body mass index is 30.53 kg/m².    Chief Complaint: Gastroesophageal Reflux and Abdominal Pain    This patient is new to me.  Referring Provider: Dr. Sofi Peterson for LUQ pain.     Abdominal Pain  This is a new (started Tuesday) problem. The current episode started in the past 7 days. The onset quality is gradual. The problem occurs constantly. Duration: lasted Tuesday through Friday. The problem has been gradually improving. The pain is located in the LUQ. The pain is at a severity of 0/10. The patient is experiencing no pain. The quality of the pain is dull (perssure). The abdominal pain does not radiate. Associated symptoms include belching (after eating). Pertinent negatives include no constipation (Typically has 1 BM every 2-3 days rated stool 3-4 on [place] scale), diarrhea, dysuria, fever, flatus, frequency, headaches, hematochezia, hematuria, melena, nausea, vomiting or weight loss. The pain is aggravated by eating, certain positions and palpation (bending/sitting position). The pain is relieved by Certain positions (laying down). He has tried proton pump inhibitors and antacids (Currently taking omeprazole 40 mg once daily, but will discontinue once starting Nexium; was switched to Nexium 40 mg b.i.d., but has not started) for the symptoms. The treatment provided moderate relief. Prior diagnostic workup includes GI consult, CT scan and ultrasound. His past medical history is significant for GERD (Reflux improved after starting omeprazole 40 mg once daily, but still occurs every 2-3 days (was occurring multiple times a day); has tried OTC antacids in the past with improvement). There is no history of abdominal surgery, colon cancer, Crohn's disease, gallstones, irritable bowel syndrome, pancreatitis, PUD or ulcerative colitis. Patient's medical history does not include kidney stones and UTI.     Review of Systems   Constitutional:   Positive for appetite change (gradually improving). Negative for activity change, chills, diaphoresis, fatigue, fever, unexpected weight change and weight loss.   HENT:  Negative for sore throat and trouble swallowing.    Respiratory:  Negative for cough, choking and shortness of breath.    Cardiovascular:  Negative for chest pain.   Gastrointestinal:  Positive for abdominal pain. Negative for abdominal distention, anal bleeding, blood in stool, constipation (Typically has 1 BM every 2-3 days rated stool 3-4 on [place] scale), diarrhea, flatus, hematochezia, melena, nausea, rectal pain and vomiting.   Genitourinary:  Negative for dysuria, frequency and hematuria.   Neurological:  Negative for headaches.       No LMP for male patient.  Past Medical History:   Diagnosis Date    Anxiety     GERD (gastroesophageal reflux disease)     Headache      Past Surgical History:   Procedure Laterality Date    FOREARM FRACTURE SURGERY Left 2000    FOREARM SURGERY Right 1995    Hook removal    FRACTURE SURGERY  July 4th, 2001    Broken Arm when I was a teenager that required surgery.     Family History   Problem Relation Name Age of Onset    Breast cancer Mother  50    Breast cancer Maternal Aunt      Ovarian cancer Maternal Aunt      Diabetes [facility]     Heart disease Neg Hx      Colon cancer Neg Hx      Esophageal cancer Neg Hx      Stomach cancer Neg Hx      Ulcerative colitis Neg Hx      Crohn's disease Neg Hx       Social History     Tobacco Use    Smoking status: Never    Smokeless tobacco: Never   Substance Use Topics    Alcohol use: No    Drug use: Never     Wt Readings from Last 10 Encounters:   01/28/25 102.1 kg (225 lb 1.4 oz)   01/24/25 102.7 kg (226 lb 6.6 oz)   12/17/24 100.8 kg (222 lb 5.3 oz)   06/26/24 101.1 kg (222 lb 14.2 oz)   08/16/23 106.4 kg (234 lb 10.9 oz)   05/09/23 105.5 kg (232 lb 9.4 oz)   03/14/23 107 kg (235 lb 14.3 oz)   03/07/23 104.8 kg (231 lb 0.7 oz)   02/06/23 106.9  kg (235 lb 10.8 oz)   01/26/23 103 kg (227 lb 1.2 oz)     Lab Results   Component Value Date    WBC 5.46 11/10/2022    HGB 15.7 11/10/2022    HCT 45.7 11/10/2022    MCV 85 11/10/2022     11/10/2022     CMP  Sodium   Date Value Ref Range Status   06/26/2024 143 136 - 145 mmol/L Final     Potassium   Date Value Ref Range Status   06/26/2024 3.8 3.5 - 5.1 mmol/L Final     Chloride   Date Value Ref Range Status   06/26/2024 107 95 - 110 mmol/L Final     CO2   Date Value Ref Range Status   06/26/2024 26 23 - 29 mmol/L Final     Glucose   Date Value Ref Range Status   06/26/2024 90 70 - 110 mg/dL Final     BUN   Date Value Ref Range Status   06/26/2024 13 6 - 20 mg/dL Final     Creatinine   Date Value Ref Range Status   06/26/2024 0.8 0.5 - 1.4 mg/dL Final     Calcium   Date Value Ref Range Status   06/26/2024 9.4 8.7 - 10.5 mg/dL Final     Total Protein   Date Value Ref Range Status   06/26/2024 7.6 6.0 - 8.4 g/dL Final     Albumin   Date Value Ref Range Status   06/26/2024 4.0 3.5 - 5.2 g/dL Final     Total Bilirubin   Date Value Ref Range Status   06/26/2024 0.4 0.1 - 1.0 mg/dL Final     Comment:     For infants and newborns, interpretation of results should be based  on gestational age, weight and in agreement with clinical  observations.    Premature Infant recommended reference ranges:  Up to 24 hours.............<8.0 mg/dL  Up to 48 hours............<12.0 mg/dL  3-5 days..................<15.0 mg/dL  6-29 days.................<15.0 mg/dL       Alkaline Phosphatase   Date Value Ref Range Status   06/26/2024 91 55 - 135 U/L Final     AST   Date Value Ref Range Status   06/26/2024 19 10 - 40 U/L Final     ALT   Date Value Ref Range Status   06/26/2024 34 10 - 44 U/L Final     Anion Gap   Date Value Ref Range Status   06/26/2024 10 8 - 16 mmol/L Final     eGFR if    Date Value Ref Range Status   12/13/2011 >60 >60 mL/min Final     Comment:     Estimated glomerular filtration rate (eGFR) is  normalized to an  average body surface area of 1.73 square meters.  The calculation  used to obtain the eGFR is the adjusted MDRD equation, which factors  patient sex, age, race, and creatinine result.  Since race is unknown  in our information system, the eGFR values for -American  and Non--American patients are given for each creatinine  result.     eGFR if non    Date Value Ref Range Status   12/13/2011 >60 >60 mL/min Final     Lab Results   Component Value Date    LIPASE 27 11/10/2022     Lab Results   Component Value Date    TSH 0.614 06/26/2024     Reviewed prior medical records including radiology report of CT chest abdomen and pelvis 01/24/2025, abdominal ultrasound 02/28/2023, MRI abdomen 11/29/2022, CT abdomen and pelvis 11/10/2022 & endoscopy history (see surgical history).    Objective:      Physical Exam  Vitals and nursing note reviewed.   Constitutional:       General: He is not in acute distress.     Appearance: Normal appearance. He is not ill-appearing.   HENT:      Mouth/Throat:      Lips: Pink. No lesions.   Cardiovascular:      Heart sounds: Normal heart sounds.   Pulmonary:      Effort: Pulmonary effort is normal. No respiratory distress.      Breath sounds: Normal breath sounds.   Abdominal:      General: Bowel sounds are normal. There is no distension or abdominal bruit. There are no signs of injury.      Palpations: Abdomen is soft. There is no shifting dullness, fluid wave, hepatomegaly, splenomegaly or mass.      Tenderness: There is abdominal tenderness in the epigastric area and left upper quadrant. There is no guarding or rebound. Negative signs include Teague's sign, Rovsing's sign and McBurney's sign.   Skin:     General: Skin is warm and dry.      Coloration: Skin is not jaundiced or pale.   Neurological:      Mental Status: He is alert and oriented to person, place, and time.   Psychiatric:         Attention and Perception: Attention normal.          Mood and Affect: Mood normal.         Speech: Speech normal.         Behavior: Behavior normal.         Assessment:       1. LUQ pain    2. Gastroesophageal reflux disease, unspecified whether esophagitis present    3. Belching        Plan:       LUQ pain  - schedule EGD, discussed procedure with patient, including risks and benefits, patient verbalized understanding  -Avoid large meals, avoid eating within 2-3 hours of bedtime (avoid late night eating & lying down soon after eating), elevate head of bed if nocturnal symptoms are present, smoking cessation (if current smoker), & weight loss (if overweight).   -Avoid known foods which trigger symptoms & to minimize/avoid high-fat foods, chocolate, caffeine, citrus, alcohol, & tomato products.  -Avoid/limit use of NSAID's, since they can cause GI upset, bleeding, and/or ulcers. If needed, take with food.  - continue omeprazole 40 mg once daily for now, but discontinue after starting Nexium 40 mg b.i.d.    Gastroesophageal reflux disease, unspecified whether esophagitis present & Belching  - schedule EGD, discussed procedure with patient, including risks and benefits, patient verbalized understanding  -Avoid large meals, avoid eating within 2-3 hours of bedtime (avoid late night eating & lying down soon after eating), elevate head of bed if nocturnal symptoms are present, smoking cessation (if current smoker), & weight loss (if overweight).   -Avoid known foods which trigger reflux symptoms & to minimize/avoid high-fat foods, chocolate, caffeine, citrus, alcohol, & tomato products.  -Avoid/limit use of NSAID's, since they can cause GI upset, bleeding, and/or ulcers. If needed, take with food.  - continue omeprazole 40 mg once daily for now, but discontinue after starting Nexium 40 mg b.i.d.    Follow up in about 4 weeks (around 2/25/2025), or if symptoms worsen or fail to improve.      If no improvement in symptoms or symptoms worsen, call/follow-up at clinic or go to  ER.        Total time spent on the encounter includes face to face time and non-face to face time preparing to see the patient (eg, review of tests), Obtaining and/or reviewing separately obtained history, Documenting clinical information in the electronic or other health record, Independently interpreting results (not separately reported) and communicating results to the patient/family/caregiver, or Care coordination (not separately reported).     A dictation software program was used for this note. Please expect some simple typographical  errors in this note.

## 2025-03-18 ENCOUNTER — ANESTHESIA EVENT (OUTPATIENT)
Dept: ENDOSCOPY | Facility: HOSPITAL | Age: 40
End: 2025-03-18
Payer: COMMERCIAL

## 2025-03-18 ENCOUNTER — ANESTHESIA (OUTPATIENT)
Dept: ENDOSCOPY | Facility: HOSPITAL | Age: 40
End: 2025-03-18
Payer: COMMERCIAL

## 2025-03-18 ENCOUNTER — HOSPITAL ENCOUNTER (OUTPATIENT)
Facility: HOSPITAL | Age: 40
Discharge: HOME | End: 2025-03-18
Attending: INTERNAL MEDICINE | Admitting: INTERNAL MEDICINE
Payer: COMMERCIAL

## 2025-03-18 VITALS
BODY MASS INDEX: 30.48 KG/M2 | RESPIRATION RATE: 15 BRPM | SYSTOLIC BLOOD PRESSURE: 132 MMHG | TEMPERATURE: 98 F | WEIGHT: 225 LBS | HEIGHT: 72 IN | OXYGEN SATURATION: 98 % | DIASTOLIC BLOOD PRESSURE: 87 MMHG | HEART RATE: 67 BPM

## 2025-03-18 DIAGNOSIS — K21.9 GERD (GASTROESOPHAGEAL REFLUX DISEASE): ICD-10-CM

## 2025-03-18 PROCEDURE — 63600175 PHARM REV CODE 636 W HCPCS: Mod: PO | Performed by: NURSE ANESTHETIST, CERTIFIED REGISTERED

## 2025-03-18 PROCEDURE — 88305 TISSUE EXAM BY PATHOLOGIST: CPT | Mod: PO | Performed by: PATHOLOGY

## 2025-03-18 PROCEDURE — 37000008 HC ANESTHESIA 1ST 15 MINUTES: Mod: PO | Performed by: INTERNAL MEDICINE

## 2025-03-18 PROCEDURE — 43239 EGD BIOPSY SINGLE/MULTIPLE: CPT | Mod: ,,, | Performed by: INTERNAL MEDICINE

## 2025-03-18 PROCEDURE — 63600175 PHARM REV CODE 636 W HCPCS: Mod: PO | Performed by: INTERNAL MEDICINE

## 2025-03-18 PROCEDURE — 43239 EGD BIOPSY SINGLE/MULTIPLE: CPT | Mod: PO | Performed by: INTERNAL MEDICINE

## 2025-03-18 PROCEDURE — 88305 TISSUE EXAM BY PATHOLOGIST: CPT | Mod: 26,,, | Performed by: PATHOLOGY

## 2025-03-18 PROCEDURE — 37000009 HC ANESTHESIA EA ADD 15 MINS: Mod: PO | Performed by: INTERNAL MEDICINE

## 2025-03-18 PROCEDURE — 27201012 HC FORCEPS, HOT/COLD, DISP: Mod: PO | Performed by: INTERNAL MEDICINE

## 2025-03-18 RX ORDER — LIDOCAINE HYDROCHLORIDE 20 MG/ML
INJECTION INTRAVENOUS
Status: DISCONTINUED | OUTPATIENT
Start: 2025-03-18 | End: 2025-03-18

## 2025-03-18 RX ORDER — SODIUM CHLORIDE, SODIUM LACTATE, POTASSIUM CHLORIDE, CALCIUM CHLORIDE 600; 310; 30; 20 MG/100ML; MG/100ML; MG/100ML; MG/100ML
INJECTION, SOLUTION INTRAVENOUS CONTINUOUS
Status: DISCONTINUED | OUTPATIENT
Start: 2025-03-18 | End: 2025-03-18 | Stop reason: HOSPADM

## 2025-03-18 RX ORDER — SODIUM CHLORIDE 0.9 % (FLUSH) 0.9 %
10 SYRINGE (ML) INJECTION
Status: DISCONTINUED | OUTPATIENT
Start: 2025-03-18 | End: 2025-03-18 | Stop reason: HOSPADM

## 2025-03-18 RX ORDER — PROPOFOL 10 MG/ML
VIAL (ML) INTRAVENOUS
Status: DISCONTINUED | OUTPATIENT
Start: 2025-03-18 | End: 2025-03-18

## 2025-03-18 RX ADMIN — PROPOFOL 50 MG: 10 INJECTION, EMULSION INTRAVENOUS at 08:03

## 2025-03-18 RX ADMIN — LIDOCAINE HYDROCHLORIDE 100 MG: 20 INJECTION INTRAVENOUS at 08:03

## 2025-03-18 RX ADMIN — SODIUM CHLORIDE, POTASSIUM CHLORIDE, SODIUM LACTATE AND CALCIUM CHLORIDE: 600; 310; 30; 20 INJECTION, SOLUTION INTRAVENOUS at 08:03

## 2025-03-18 RX ADMIN — PROPOFOL 100 MG: 10 INJECTION, EMULSION INTRAVENOUS at 08:03

## 2025-03-18 NOTE — PROVATION PATIENT INSTRUCTIONS
Discharge Summary/Instructions after an Endoscopic Procedure  Patient Name: Brian Gonzalez  Patient MRN: [id number]  Patient YOB: 1985 Tuesday, March 18, 2025  Javad Anderson MD  Dear patient,  As a result of recent federal legislation (The Federal Cures Act), you may   receive lab or pathology results from your procedure in your [facility]   account before your physician is able to contact you. Your physician or   their representative will relay the results to you with their   recommendations at their soonest availability.  Thank you,  RESTRICTIONS:  During your procedure today, you received medications for sedation.  These   medications may affect your judgment, balance and coordination.  Therefore,   for 24 hours, you have the following restrictions:   - DO NOT drive a car, operate machinery, make legal/financial decisions,   sign important papers or drink alcohol.    ACTIVITY:  Today: no heavy lifting, straining or running due to procedural   sedation/anesthesia.  The following day: return to full activity including work.  DIET:  Eat and drink normally unless instructed otherwise.     TREATMENT FOR COMMON SIDE EFFECTS:  - Mild abdominal pain, nausea, belching, bloating or excessive gas:  rest,   eat lightly and use a heating pad.  - Sore Throat: treat with throat lozenges and/or gargle with warm salt   water.  - Because air was used during the procedure, expelling large amounts of air   from your rectum or belching is normal.  - If a bowel prep was taken, you may not have a bowel movement for 1-3 days.    This is normal.  SYMPTOMS TO WATCH FOR AND REPORT TO YOUR PHYSICIAN:  1. Abdominal pain or bloating, other than gas cramps.  2. Chest pain.  3. Back pain.  4. Signs of infection such as: chills or fever occurring within 24 hours   after the procedure.  5. Rectal bleeding, which would show as bright red, maroon, or black stools.   (A tablespoon of blood from the rectum is not serious, especially if    hemorrhoids are present.)  6. Vomiting.  7. Weakness or dizziness.  GO DIRECTLY TO THE NEAREST EMERGENCY ROOM IF YOU HAVE ANY OF THE FOLLOWING:      Difficulty breathing              Chills and/or fever over 101 F   Persistent vomiting and/or vomiting blood   Severe abdominal pain   Severe chest pain   Black, tarry stools   Bleeding- more than one tablespoon   Any other symptom or condition that you feel may need urgent attention  Your doctor recommends these additional instructions:  If any biopsies were taken, your doctors clinic will contact you in 1 to 2   weeks with any results.  We are waiting for your pathology results.   Continue your present medications.   You are being discharged to home.  For questions, problems or results please call your physician - Javad Anderson MD at Work:  [phone].  EMERGENCY PHONE NUMBER: [phone], LAB RESULTS: [phone]  IF A COMPLICATION OR EMERGENCY SITUATION ARISES AND YOU ARE UNABLE TO REACH   YOUR PHYSICIAN - GO DIRECTLY TO THE EMERGENCY ROOM.  ___________________________________________  Nurse Signature  ___________________________________________  Patient/Designated Responsible Party Signature  Javad Anderson MD  3/18/2025 8:59:57 AM  This report has been verified and signed electronically.  Dear patient,  As a result of recent federal legislation (The Federal Cures Act), you may   receive lab or pathology results from your procedure in your [facility]   account before your physician is able to contact you. Your physician or   their representative will relay the results to you with their   recommendations at their soonest availability.  Thank you.  PROVATION

## 2025-03-18 NOTE — DISCHARGE SUMMARY
[place] - Endoscopy  Discharge Note  Short Stay  Discharge Note  Short Stay      SUMMARY     Admit Date: 3/18/2025    Attending Physician: Javad Anderson MD     Discharge Physician: Javad Anderson MD    Discharge Date: 3/18/2025 9:00 AM    Final Diagnosis: Gastroesophageal reflux disease, unspecified whether esophagitis present [K21.9]  LUQ pain [R10.12]    Disposition: HOME OR SELF CARE    Patient Instructions:   Current Discharge Medication List        CONTINUE these medications which have NOT CHANGED    Details   azelastine (ASTELIN) 137 mcg (0.1 %) nasal spray 1 spray (137 mcg total) by Nasal route 2 (two) times daily.  Qty: 30 mL, Refills: 11    Associated Diagnoses: Seasonal allergies      cetirizine (ZYRTEC) 5 MG tablet Take 5 mg by mouth daily as needed.    Associated Diagnoses: Seasonal allergies      esomeprazole (NEXIUM) 40 MG capsule Take 1 capsule (40 mg total) by mouth 2 (two) times daily before meals.  Qty: 84 capsule, Refills: 0    Associated Diagnoses: Epigastric pain      fluticasone propionate (FLONASE) 50 mcg/actuation nasal spray 1 spray (50 mcg total) by Each Nostril route once daily.  Qty: 18.2 mL, Refills: 11    Associated Diagnoses: Seasonal allergies             Discharge Procedure Orders (must include Diet, Follow-up, Activity)    Follow Up:  Follow up with PCP as previously scheduled  Resume routine diet.  Activity as tolerated.    No driving day of procedure.

## 2025-03-18 NOTE — ANESTHESIA POSTPROCEDURE EVALUATION
Anesthesia Post Evaluation    Patient: Brian Gonzalez    Procedure(s) Performed: Procedure(s) (LRB):  EGD (ESOPHAGOGASTRODUODENOSCOPY) (N/A)    Final Anesthesia Type: general      Patient location during evaluation: PACU  Patient participation: Yes- Able to Participate  Level of consciousness: awake and alert and oriented  Post-procedure vital signs: reviewed and stable  Pain management: adequate  Airway patency: patent    PONV status at discharge: No PONV  Anesthetic complications: no      Cardiovascular status: blood pressure returned to baseline and stable  Respiratory status: unassisted and spontaneous ventilation  Hydration status: euvolemic  Follow-up not needed.              Vitals Value Taken Time   /87 03/18/25 09:26   Temp   03/18/25 12:47   Pulse 67 03/18/25 09:26   Resp 15 03/18/25 09:26   SpO2 98 % 03/18/25 09:26         Event Time   Out of Recovery 09:30:00         Pain/Kai Score: Kai Score: 10 (3/18/2025  9:16 AM)

## 2025-03-18 NOTE — TRANSFER OF CARE
Anesthesia Transfer of Care Note    Patient: Brian Gonzalez    Procedure(s) Performed: Procedure(s) (LRB):  EGD (ESOPHAGOGASTRODUODENOSCOPY) (N/A)    Patient location: PACU    Anesthesia Type: general    Transport from OR: Transported from OR on room air with adequate spontaneous ventilation    Post pain: adequate analgesia    Post assessment: no apparent anesthetic complications and tolerated procedure well    Post vital signs: stable    Level of consciousness: sedated    Nausea/Vomiting: no nausea/vomiting    Complications: none    Transfer of care protocol was followed      Last vitals: Visit Vitals  /80 (BP Location: Right arm, Patient Position: Sitting)   Pulse 71   Temp 36.5 °C (97.7 °F) (Skin)   Resp 18   Ht 6' (1.829 m)   Wt 102.1 kg (225 lb)   SpO2 97%   BMI 30.52 kg/m²

## 2025-03-18 NOTE — H&P
History & Physical - Short Stay  Gastroenterology      SUBJECTIVE:     Procedure: EGD    Chief Complaint/Indication for Procedure: Abdominal Pain and Reflux    PTA Medications   Medication Sig    azelastine (ASTELIN) 137 mcg (0.1 %) nasal spray 1 spray (137 mcg total) by Nasal route 2 (two) times daily.    cetirizine (ZYRTEC) 5 MG tablet Take 5 mg by mouth daily as needed.    esomeprazole (NEXIUM) 40 MG capsule Take 1 capsule (40 mg total) by mouth 2 (two) times daily before meals.    fluticasone propionate (FLONASE) 50 mcg/actuation nasal spray 1 spray (50 mcg total) by Each Nostril route once daily.       Review of patient's allergies indicates:  No Known Allergies     Past Medical History:   Diagnosis Date    Anxiety     GERD (gastroesophageal reflux disease)     Headache      Past Surgical History:   Procedure Laterality Date    FOREARM FRACTURE SURGERY Left 2000    FOREARM SURGERY Right 1995    Hook removal    FRACTURE SURGERY  July 4th, 2001    Broken Arm when I was a teenager that required surgery.     Family History   Problem Relation Name Age of Onset    Breast cancer Mother  50    Breast cancer Maternal Aunt      Ovarian cancer Maternal Aunt      Diabetes Maternal Grandmother Yulia [facility]     Heart disease Neg Hx      Colon cancer Neg Hx      Esophageal cancer Neg Hx      Stomach cancer Neg Hx      Ulcerative colitis Neg Hx      Crohn's disease Neg Hx       Social History[1]      OBJECTIVE:     Vital Signs (Most Recent)  Temp: 97.7 °F (36.5 °C) (03/18/25 0801)  Pulse: 71 (03/18/25 0801)  Resp: 18 (03/18/25 0801)  BP: 120/80 (03/18/25 0801)  SpO2: 97 % (03/18/25 0801)    Physical Exam:                                                       GENERAL:  Comfortable, in no acute distress.                                 HEENT EXAM:  Nonicteric.  No adenopathy.  Oropharynx is clear.               NECK:  Supple.                                                               LUNGS:  Clear.                                                                CARDIAC:  Regular rate and rhythm.  S1, S2.  No murmur.                      ABDOMEN:  Soft, positive bowel sounds, nontender.  No hepatosplenomegaly or masses.  No rebound or guarding.                                             EXTREMITIES:  No edema.     MENTAL STATUS:  Normal, alert and oriented.      ASSESSMENT/PLAN:     Assessment: Abdominal Pain and Reflux    Plan: EGD    Anesthesia Plan: General    ASA Grade: ASA 2 - Patient with mild systemic disease with no functional limitations    MALLAMPATI SCORE:  I (soft palate, uvula, fauces, and tonsillar pillars visible)           [1]   Social History  Tobacco Use    Smoking status: Never    Smokeless tobacco: Never   Substance Use Topics    Alcohol use: No    Drug use: Never

## 2025-03-18 NOTE — ANESTHESIA PREPROCEDURE EVALUATION
03/18/2025  Brian Gonzalez is a 39 y.o., male.      Pre-op Assessment    I have reviewed the Patient Summary Reports.     I have reviewed the Nursing Notes. I have reviewed the NPO Status.   I have reviewed the Medications.     Review of Systems  Anesthesia Hx:  No problems with previous Anesthesia                Social:  Non-Smoker       Cardiovascular:  Cardiovascular Normal                                              Pulmonary:  Pulmonary Normal                       Renal/:  Renal/ Normal                 Hepatic/GI:     GERD, well controlled                Neurological:      Headaches                                 Endocrine:  Endocrine Normal          Obesity / BMI > 30  Psych:  Psychiatric History anxiety               Physical Exam  General: Well nourished, Cooperative, Alert and Oriented    Airway:  Mallampati: II   Mouth Opening: Normal  TM Distance: Normal  Neck ROM: Normal ROM    Anesthesia Plan  Type of Anesthesia, risks & benefits discussed:    Anesthesia Type: Gen ETT, Gen Supraglottic Airway, Gen Natural Airway, MAC  Intra-op Monitoring Plan: Standard ASA Monitors  Post Op Pain Control Plan: multimodal analgesia  Induction:  IV  Airway Plan: Direct, Video and Fiberoptic, Post-Induction  Informed Consent: Informed consent signed with the Patient and all parties understand the risks and agree with anesthesia plan.  All questions answered.   ASA Score: 2    Ready For Surgery From Anesthesia Perspective.   .

## 2025-03-21 LAB
FINAL PATHOLOGIC DIAGNOSIS: NORMAL
GROSS: NORMAL
Lab: NORMAL

## 2025-07-23 ENCOUNTER — HOSPITAL ENCOUNTER (OUTPATIENT)
Dept: RADIOLOGY | Facility: HOSPITAL | Age: 40
Discharge: HOME | End: 2025-07-23
Attending: STUDENT IN AN ORGANIZED HEALTH CARE EDUCATION/TRAINING PROGRAM
Payer: COMMERCIAL

## 2025-07-23 ENCOUNTER — OFFICE VISIT (OUTPATIENT)
Dept: FAMILY MEDICINE | Facility: CLINIC | Age: 40
End: 2025-07-23
Payer: COMMERCIAL

## 2025-07-23 VITALS
WEIGHT: 220.38 LBS | SYSTOLIC BLOOD PRESSURE: 128 MMHG | BODY MASS INDEX: 29.88 KG/M2 | HEART RATE: 76 BPM | DIASTOLIC BLOOD PRESSURE: 88 MMHG | OXYGEN SATURATION: 97 %

## 2025-07-23 DIAGNOSIS — R61 NIGHT SWEATS: ICD-10-CM

## 2025-07-23 DIAGNOSIS — Z00.00 HEALTHCARE MAINTENANCE: Primary | ICD-10-CM

## 2025-07-23 DIAGNOSIS — R05.2 SUBACUTE COUGH: ICD-10-CM

## 2025-07-23 DIAGNOSIS — K21.9 GASTROESOPHAGEAL REFLUX DISEASE WITHOUT ESOPHAGITIS: ICD-10-CM

## 2025-07-23 PROBLEM — M54.6 ACUTE MIDLINE THORACIC BACK PAIN: Status: RESOLVED | Noted: 2023-02-06 | Resolved: 2025-07-23

## 2025-07-23 PROBLEM — Z23 NEED FOR VACCINATION: Status: RESOLVED | Noted: 2022-11-02 | Resolved: 2025-07-23

## 2025-07-23 PROBLEM — R42 DIZZY SPELLS: Status: RESOLVED | Noted: 2023-03-22 | Resolved: 2025-07-23

## 2025-07-23 PROCEDURE — 99999 PR PBB SHADOW E&M-EST. PATIENT-LVL III: CPT | Mod: PBBFAC,,, | Performed by: STUDENT IN AN ORGANIZED HEALTH CARE EDUCATION/TRAINING PROGRAM

## 2025-07-23 PROCEDURE — 71046 X-RAY EXAM CHEST 2 VIEWS: CPT | Mod: 26,,, | Performed by: RADIOLOGY

## 2025-07-23 PROCEDURE — 71046 X-RAY EXAM CHEST 2 VIEWS: CPT | Mod: TC,PO

## 2025-07-23 RX ORDER — PREDNISONE 20 MG/1
20 TABLET ORAL DAILY
Qty: 5 TABLET | Refills: 0 | Status: SHIPPED | OUTPATIENT
Start: 2025-07-23 | End: 2025-07-28

## 2025-07-23 RX ORDER — ESOMEPRAZOLE MAGNESIUM 40 MG/1
40 CAPSULE, DELAYED RELEASE ORAL
Qty: 180 CAPSULE | Refills: 3 | Status: SHIPPED | OUTPATIENT
Start: 2025-07-23

## 2025-07-23 NOTE — PROGRESS NOTES
"Name: Brian Gonzalez  MRN: [id number]  : 1985  PCP: Ronaldo Gutierrez MD    Subjective   HPI  Patient presents for follow up of chest pain. He had an EGD in the interval which was normal, both grossly and microscopically. His chest pain does respond to esomeprazole when taken prior to meals (he'll describe an acid "sensation" in his esophagus but he differentiates this from reflux). He only takes esomeprazole sparingly. The chest tightness/spasm described previously has only happened about twice since we last saw him.     Notes that he was diagnosed with COVID for the first time this past May. He had antibiotics through the urgent care - no other prescriptions. He had a significant cough throughout May and through half of . The cough is now better but it will still crop up if he breathes the wrong way. He also had night sweats that still come and go but not as frequently.     Review of Systems     Problem List[1]    Medications Ordered Prior to Encounter[2]    Past Medical History:   Diagnosis Date    Anxiety     GERD (gastroesophageal reflux disease)     Headache     Migraine headache     Noticed a lot in college, and have seen different doctors about it. Have been told it may be caused from sinus issues. Severe mirgraines run in the family and had to go to the ER on one occation for a severe migraine ().    Seasonal allergies Toddler    Always have dealt with seasonal allergies, especially when the weather changes temperatures quickly.       Past Surgical History:   Procedure Laterality Date    ESOPHAGOGASTRODUODENOSCOPY N/A 3/18/2025    Procedure: EGD (ESOPHAGOGASTRODUODENOSCOPY);  Surgeon: Javad Anderson MD;  Location: [facility];  Service: Gastroenterology;  Laterality: N/A;    FOREARM FRACTURE SURGERY Left     FOREARM SURGERY Right     Hook removal    FRACTURE SURGERY  2001    Broken Arm when I was a teenager that required surgery.        Family History "   Problem Relation Name Age of Onset    Breast cancer Mother  50    Breast cancer Maternal Aunt      Ovarian cancer Maternal Aunt      Diabetes Maternal Grandmother Yulia Ruffin     Migraines Paternal Grandmother Annie Gonzalez     Heart disease Neg Hx      Colon cancer Neg Hx      Esophageal cancer Neg Hx      Stomach cancer Neg Hx      Ulcerative colitis Neg Hx      Crohn's disease Neg Hx         Social History[3]    Review of patient's allergies indicates:  No Known Allergies     Health Maintenance Due   Topic Date Due    Pneumococcal Vaccines (Age 0-49) (1 of 2 - PCV) Never done    COVID-19 Vaccine (5 - 2024-25 season) 09/01/2024       Objective   Vitals:    07/23/25 0736   BP: 128/88   Pulse: 76      Wt Readings from Last 1 Encounters:   07/23/25 100 kg (220 lb 5.6 oz)   ]  Body mass index is 29.88 kg/m².    Physical Exam  Constitutional:       General: He is not in acute distress.     Appearance: Normal appearance. He is well-developed.   HENT:      Head: Normocephalic and atraumatic.      Right Ear: External ear normal.      Left Ear: External ear normal.   Eyes:      Conjunctiva/sclera: Conjunctivae normal.   Cardiovascular:      Rate and Rhythm: Normal rate and regular rhythm.      Heart sounds: No murmur heard.     No friction rub. No gallop.   Pulmonary:      Effort: Pulmonary effort is normal. No respiratory distress.      Breath sounds: No wheezing, rhonchi or rales.   Abdominal:      General: Abdomen is flat. There is no distension.   Musculoskeletal:         General: No swelling or deformity.      Right lower leg: No edema.      Left lower leg: No edema.   Skin:     General: Skin is warm and dry.      Coloration: Skin is not jaundiced.   Neurological:      Mental Status: He is alert and oriented to person, place, and time. Mental status is at baseline.   Psychiatric:         Attention and Perception: Attention and perception normal.         Mood and Affect: Mood normal.         Speech: Speech normal.          Behavior: Behavior normal. Behavior is cooperative.         Thought Content: Thought content normal.         Cognition and Memory: Cognition normal.         Judgment: Judgment normal.          Assessment & Plan    1. Healthcare maintenance  -     Comprehensive Metabolic Panel; Future; Expected date: 07/23/2025  -     Hemoglobin A1C; Future; Expected date: 07/23/2025    2. Gastroesophageal reflux disease without esophagitis  Assessment & Plan:  Continue esomeprazole. Monitor for any change in symptoms.    Orders:  -     esomeprazole (NEXIUM) 40 MG capsule; Take 1 capsule (40 mg total) by mouth 2 (two) times daily before meals.  Dispense: 180 capsule; Refill: 3    3. Night sweats  -     X-Ray Chest PA And Lateral; Future; Expected date: 07/23/2025  -     CBC Auto Differential; Future; Expected date: 07/23/2025  -     TSH; Future; Expected date: 07/23/2025    4. Subacute cough  -     predniSONE (DELTASONE) 20 MG tablet; Take 1 tablet (20 mg total) by mouth once daily. for 5 days  Dispense: 5 tablet; Refill: 0  -     X-Ray Chest PA And Lateral; Future; Expected date: 07/23/2025  -     CBC Auto Differential; Future; Expected date: 07/23/2025         Follow up in 6 months.     Ronaldo Gutierrez MD  07/23/2025          [1]   Patient Active Problem List  Diagnosis    Anxiety    Dizziness    Seasonal allergies    Vestibular migraine    Migraine without aura and without status migrainosus, not intractable    Neck pain    Decreased ROM of intervertebral discs of cervical spine    Gastroesophageal reflux disease   [2]   Current Outpatient Medications on File Prior to Visit   Medication Sig Dispense Refill    azelastine (ASTELIN) 137 mcg (0.1 %) nasal spray 1 spray (137 mcg total) by Nasal route 2 (two) times daily. 30 mL 11    cetirizine (ZYRTEC) 5 MG tablet Take 5 mg by mouth daily as needed.      fluticasone propionate (FLONASE) 50 mcg/actuation nasal spray 1 spray (50 mcg total) by Each Nostril route once daily. 18.2 mL  11    [DISCONTINUED] esomeprazole (NEXIUM) 40 MG capsule Take 1 capsule (40 mg total) by mouth 2 (two) times daily before meals. 84 capsule 0     No current facility-administered medications on file prior to visit.   [3]   Social History  Socioeconomic History    Marital status:    Occupational History    Occupation:    Tobacco Use    Smoking status: Never    Smokeless tobacco: Never   Substance and Sexual Activity    Alcohol use: No    Drug use: Never    Sexual activity: Not Currently     Partners: Female     Birth control/protection: Condom   Social History Narrative    Lives with wife. Two cats. 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      Social Drivers of Health     Financial Resource Strain: Low Risk  (1/21/2025)    Overall Financial Resource Strain (CARDIA)     Difficulty of Paying Living Expenses: Not very hard   Food Insecurity: Patient Declined (1/21/2025)    Hunger Vital Sign     Worried About Running Out of Food in the Last Year: Patient declined     Ran Out of Food in the Last Year: Patient declined   Physical Activity: Inactive (1/21/2025)    Exercise Vital Sign     Days of Exercise per Week: 0 days     Minutes of Exercise per Session: 0 min   Stress: Stress Concern Present (1/21/2025)    [employer] - Occupational Stress Questionnaire     Feeling of Stress : To some extent   Housing Stability: Unknown (1/21/2025)    Housing Stability Vital Sign     Unable to Pay for Housing in the Last Year: No

## 2025-08-15 ENCOUNTER — OFFICE VISIT (OUTPATIENT)
Dept: FAMILY MEDICINE | Facility: CLINIC | Age: 40
End: 2025-08-15
Payer: COMMERCIAL

## 2025-08-15 VITALS
BODY MASS INDEX: 30.1 KG/M2 | TEMPERATURE: 99 F | HEIGHT: 72 IN | OXYGEN SATURATION: 100 % | DIASTOLIC BLOOD PRESSURE: 88 MMHG | SYSTOLIC BLOOD PRESSURE: 132 MMHG | HEART RATE: 76 BPM | WEIGHT: 222.25 LBS

## 2025-08-15 DIAGNOSIS — B34.9 VIRAL SYNDROME: Primary | ICD-10-CM

## 2025-08-15 PROCEDURE — 99999 PR PBB SHADOW E&M-EST. PATIENT-LVL III: CPT | Mod: PBBFAC,,, | Performed by: NURSE PRACTITIONER
